# Patient Record
Sex: MALE | Race: WHITE | NOT HISPANIC OR LATINO | Employment: OTHER | URBAN - NONMETROPOLITAN AREA
[De-identification: names, ages, dates, MRNs, and addresses within clinical notes are randomized per-mention and may not be internally consistent; named-entity substitution may affect disease eponyms.]

---

## 2020-04-08 PROBLEM — I10 HTN (HYPERTENSION): Status: ACTIVE | Noted: 2020-04-08

## 2020-04-08 PROBLEM — K21.9 ACID REFLUX: Status: ACTIVE | Noted: 2020-04-08

## 2020-04-08 PROBLEM — E78.5 HYPERLIPIDEMIA: Status: ACTIVE | Noted: 2020-04-08

## 2020-04-08 RX ORDER — NITROGLYCERIN 0.4 MG/1
0.4 TABLET SUBLINGUAL
COMMUNITY
End: 2020-04-10 | Stop reason: SDUPTHER

## 2020-04-08 RX ORDER — METOPROLOL SUCCINATE 100 MG/1
100 TABLET, EXTENDED RELEASE ORAL DAILY
COMMUNITY
End: 2020-04-10 | Stop reason: SDUPTHER

## 2020-04-08 RX ORDER — UBIDECARENONE 100 MG
200 CAPSULE ORAL DAILY
COMMUNITY
End: 2020-04-10 | Stop reason: SDUPTHER

## 2020-04-08 RX ORDER — CILOSTAZOL 100 MG/1
100 TABLET ORAL 2 TIMES DAILY
COMMUNITY
End: 2020-04-10 | Stop reason: SDUPTHER

## 2020-04-08 RX ORDER — DIGOXIN 125 MCG
125 TABLET ORAL DAILY
COMMUNITY
End: 2020-04-10 | Stop reason: SDUPTHER

## 2020-04-08 RX ORDER — OMEPRAZOLE 20 MG/1
20 CAPSULE, DELAYED RELEASE ORAL DAILY
COMMUNITY
End: 2020-04-10 | Stop reason: SDUPTHER

## 2020-04-08 RX ORDER — ASPIRIN 81 MG/1
81 TABLET ORAL DAILY
COMMUNITY
End: 2020-04-10 | Stop reason: SDUPTHER

## 2020-04-08 RX ORDER — ROSUVASTATIN CALCIUM 20 MG/1
20 TABLET, COATED ORAL DAILY
COMMUNITY
End: 2020-04-10 | Stop reason: SDUPTHER

## 2020-04-08 RX ORDER — LISINOPRIL AND HYDROCHLOROTHIAZIDE 25; 20 MG/1; MG/1
1 TABLET ORAL DAILY
COMMUNITY
End: 2020-04-10 | Stop reason: SDUPTHER

## 2020-04-10 ENCOUNTER — OFFICE VISIT (OUTPATIENT)
Dept: CARDIOLOGY | Facility: CLINIC | Age: 70
End: 2020-04-10

## 2020-04-10 VITALS
DIASTOLIC BLOOD PRESSURE: 60 MMHG | SYSTOLIC BLOOD PRESSURE: 120 MMHG | WEIGHT: 163 LBS | BODY MASS INDEX: 24.14 KG/M2 | HEIGHT: 69 IN

## 2020-04-10 DIAGNOSIS — Z99.89 OSA ON CPAP: ICD-10-CM

## 2020-04-10 DIAGNOSIS — G47.33 OSA ON CPAP: ICD-10-CM

## 2020-04-10 DIAGNOSIS — I73.9 ASYMPTOMATIC PVD (PERIPHERAL VASCULAR DISEASE) (HCC): ICD-10-CM

## 2020-04-10 DIAGNOSIS — F17.211 CIGARETTE NICOTINE DEPENDENCE IN REMISSION: ICD-10-CM

## 2020-04-10 DIAGNOSIS — I25.10 ATHEROSCLEROSIS OF NATIVE CORONARY ARTERY OF NATIVE HEART WITHOUT ANGINA PECTORIS: ICD-10-CM

## 2020-04-10 DIAGNOSIS — I10 HYPERTENSION, UNSPECIFIED TYPE: Primary | ICD-10-CM

## 2020-04-10 DIAGNOSIS — E78.5 HYPERLIPIDEMIA, UNSPECIFIED HYPERLIPIDEMIA TYPE: ICD-10-CM

## 2020-04-10 DIAGNOSIS — Z95.1 S/P CABG (CORONARY ARTERY BYPASS GRAFT): ICD-10-CM

## 2020-04-10 PROCEDURE — 99441 PR PHYS/QHP TELEPHONE EVALUATION 5-10 MIN: CPT | Performed by: SPECIALIST

## 2020-04-10 RX ORDER — CILOSTAZOL 100 MG/1
100 TABLET ORAL 2 TIMES DAILY
Qty: 180 TABLET | Refills: 1 | Status: SHIPPED | OUTPATIENT
Start: 2020-04-10 | End: 2020-10-09 | Stop reason: SDUPTHER

## 2020-04-10 RX ORDER — OMEPRAZOLE 20 MG/1
20 CAPSULE, DELAYED RELEASE ORAL DAILY
Qty: 90 CAPSULE | Refills: 1 | Status: SHIPPED | OUTPATIENT
Start: 2020-04-10 | End: 2022-04-22 | Stop reason: SDUPTHER

## 2020-04-10 RX ORDER — ROSUVASTATIN CALCIUM 20 MG/1
20 TABLET, COATED ORAL DAILY
Qty: 90 TABLET | Refills: 1 | Status: SHIPPED | OUTPATIENT
Start: 2020-04-10 | End: 2020-10-14 | Stop reason: SDUPTHER

## 2020-04-10 RX ORDER — UBIDECARENONE 100 MG
200 CAPSULE ORAL DAILY
Qty: 90 CAPSULE | Refills: 1 | Status: SHIPPED | OUTPATIENT
Start: 2020-04-10

## 2020-04-10 RX ORDER — NITROGLYCERIN 0.4 MG/1
0.4 TABLET SUBLINGUAL
Qty: 90 TABLET | Refills: 1 | Status: SHIPPED | OUTPATIENT
Start: 2020-04-10 | End: 2020-10-15 | Stop reason: SDUPTHER

## 2020-04-10 RX ORDER — LISINOPRIL AND HYDROCHLOROTHIAZIDE 25; 20 MG/1; MG/1
1 TABLET ORAL DAILY
Qty: 90 TABLET | Refills: 1 | Status: SHIPPED | OUTPATIENT
Start: 2020-04-10 | End: 2020-10-14 | Stop reason: SDUPTHER

## 2020-04-10 RX ORDER — METOPROLOL SUCCINATE 100 MG/1
100 TABLET, EXTENDED RELEASE ORAL DAILY
Qty: 90 TABLET | Refills: 1 | Status: SHIPPED | OUTPATIENT
Start: 2020-04-10 | End: 2020-10-14 | Stop reason: SDUPTHER

## 2020-04-10 RX ORDER — ASPIRIN 81 MG/1
81 TABLET ORAL DAILY
Qty: 90 TABLET | Refills: 1 | Status: SHIPPED | OUTPATIENT
Start: 2020-04-10

## 2020-04-10 NOTE — PROGRESS NOTES
Subjective     Follow up, Hypertension  yRland Trujillo is a 69 y.o. male who presents to day for Establish Care (Telephone visit to establish care ).    CHIEF COMPLIANT  Chief Complaint   Patient presents with   • Establish Care     Telephone visit to establish care        Problem list:    1. Hypertension    2. Hyperlipidemia    3. Acid reflux     4. MUSTAPHA with cpap use      Active Problems:  Problem List Items Addressed This Visit        Cardiovascular and Mediastinum    Hyperlipidemia    Relevant Medications    rosuvastatin (CRESTOR) 20 MG tablet    HTN (hypertension) - Primary    Relevant Medications    metoprolol succinate XL (TOPROL-XL) 100 MG 24 hr tablet    lisinopril-hydrochlorothiazide (PRINZIDE,ZESTORETIC) 20-25 MG per tablet    Atherosclerosis of native coronary artery of native heart without angina pectoris    Asymptomatic PVD (peripheral vascular disease) (CMS/[facility])       Other    S/P CABG (coronary artery bypass graft)    Cigarette nicotine dependence in remission          HPI  You have chosen to receive care through a telephone visit today. Do you consent to use a telephone visit for your medical care today? Yes      Mr. Trujillo has been doing well he has been walking at least 1 to 2 miles a day he denied any chest pain no leg pain he also have some shortness of breath on extreme exertion but usually stable no palpitations no dizziness his blood pressure has been doing really well no new problems still having issues with the CPAP machine with difficulty because he is not used to be sleeping with the machine but he is trying seriously to get used to it      PRIOR MEDS  Current Outpatient Medications on File Prior to Visit   Medication Sig Dispense Refill   • aspirin 81 MG EC tablet Take 81 mg by mouth Daily.     • cilostazol (PLETAL) 100 MG tablet Take 100 mg by mouth 2 (Two) Times a Day.     • coenzyme Q10 100 MG capsule Take 200 mg by mouth Daily.     • digoxin (LANOXIN) 125 MCG tablet Take 125 mcg by mouth  Daily.     • lisinopril-hydrochlorothiazide (PRINZIDE,ZESTORETIC) 20-25 MG per tablet Take 1 tablet by mouth Daily.     • metoprolol succinate XL (TOPROL-XL) 100 MG 24 hr tablet Take 100 mg by mouth Daily.     • nitroglycerin (NITROSTAT) 0.4 MG SL tablet Place 0.4 mg under the tongue Every 5 (Five) Minutes As Needed. Take no more than 3 doses in 15 minutes.     • omeprazole (priLOSEC) 20 MG capsule Take 20 mg by mouth Daily.     • rosuvastatin (CRESTOR) 20 MG tablet Take 20 mg by mouth Daily.       No current facility-administered medications on file prior to visit.        ALLERGIES  Patient has no known allergies.    HISTORY  Past Medical History:   Diagnosis Date   • Acid reflux    • HTN (hypertension)    • Hyperlipidemia    • Sleep apnea        Social History     Socioeconomic History   • Marital status:      Spouse name: Not on file   • Number of children: Not on file   • Years of education: Not on file   • Highest education level: Not on file   Tobacco Use   • Smoking status: Former Smoker     Packs/day: 1.00     Years: 16.00     Pack years: 16.00     Types: Cigarettes     Last attempt to quit: 2005     Years since quitting: 15.2   • Smokeless tobacco: Never Used   Substance and Sexual Activity   • Alcohol use: Yes     Frequency: 2-4 times a month   • Drug use: Never   • Sexual activity: Defer       Family History   Problem Relation Age of Onset   • Heart disease Mother    • Hypertension Father    • Hyperlipidemia Father        Review of Systems   Constitutional: Negative.  Negative for chills, fatigue and fever.   HENT: Positive for rhinorrhea.    Eyes: Positive for visual disturbance (reading glasses ).   Respiratory: Positive for apnea (uses cpap nightly ). Negative for chest tightness and shortness of breath.    Cardiovascular: Negative.  Negative for chest pain, palpitations and leg swelling.   Gastrointestinal: Negative.  Negative for abdominal pain, blood in stool, nausea and vomiting.  "  Endocrine: Negative.  Negative for cold intolerance and heat intolerance.   Genitourinary: Negative.  Negative for dysuria, frequency, hematuria and urgency.   Musculoskeletal: Negative.  Negative for arthralgias and back pain.   Skin: Negative.  Negative for rash and wound.   Allergic/Immunologic: Positive for environmental allergies (seasonal ). Negative for food allergies.   Neurological: Negative.  Negative for dizziness, weakness and light-headedness.   Hematological: Bruises/bleeds easily.   Psychiatric/Behavioral: Negative.  Negative for sleep disturbance (denies waking with smothering ).       Objective     VITALS: /60 (BP Location: Left arm, Patient Position: Sitting) Comment: pt reported  Ht 175.3 cm (69\")   Wt 73.9 kg (163 lb) Comment: pt reported  BMI 24.07 kg/m²     LABS:   Lab Results (most recent)     None          IMAGING:   No Images in the past 120 days found..    EXAM:  Physical Exam  Examination is not possible as this is a telephone interview  Procedure   Procedures       Assessment/Plan    Diagnosis Plan   1. Hypertension, unspecified type     2. Hyperlipidemia, unspecified hyperlipidemia type     3. Atherosclerosis of native coronary artery of native heart without angina pectoris     4. S/P CABG (coronary artery bypass graft)     5. Asymptomatic PVD (peripheral vascular disease) (CMS/[facility])     6. Cigarette nicotine dependence in remission     1.  His blood pressure is well controlled we will continue current medications  2.  He had some labs a week ago I do not have the result I will try to get the results of these labs including lipid profile and I will repeat this prior to his next visit  3.  He has no chest pain he is active we will continue current medications he has been on digoxin for unclear reason we will discontinue digoxin  4.  His peripheral vascular disease stable continues to exercise advised to keep exercising we will continue current medications  5.  As above he still " using CPAP as I encouraged him to be persistent with it and he will and getting used to  6.  He is encouraged to keep part of this nicotine he still not smoking  I will see him back in 6 months      No follow-ups on file.    Ryland was seen today for establish care.    Diagnoses and all orders for this visit:    Hypertension, unspecified type    Hyperlipidemia, unspecified hyperlipidemia type    Atherosclerosis of native coronary artery of native heart without angina pectoris    S/P CABG (coronary artery bypass graft)    Asymptomatic PVD (peripheral vascular disease) (CMS/[facility])    Cigarette nicotine dependence in remission          This visit has been rescheduled as a phone visit to comply with patient safety concerns in accordance with CDC recommendations. Total time of discussion was 8 minutes.        MEDS ORDERED DURING VISIT:  No orders of the defined types were placed in this encounter.        This document has been electronically signed by Patricia Shaffer MD  April 10, 2020 09:08

## 2020-10-09 DIAGNOSIS — I73.9 ASYMPTOMATIC PVD (PERIPHERAL VASCULAR DISEASE) (HCC): ICD-10-CM

## 2020-10-09 RX ORDER — CILOSTAZOL 100 MG/1
100 TABLET ORAL 2 TIMES DAILY
Qty: 180 TABLET | Refills: 1 | Status: SHIPPED | OUTPATIENT
Start: 2020-10-09 | End: 2020-10-14 | Stop reason: SDUPTHER

## 2020-10-14 ENCOUNTER — OFFICE VISIT (OUTPATIENT)
Dept: CARDIOLOGY | Facility: CLINIC | Age: 70
End: 2020-10-14

## 2020-10-14 VITALS
WEIGHT: 179 LBS | DIASTOLIC BLOOD PRESSURE: 81 MMHG | BODY MASS INDEX: 26.51 KG/M2 | SYSTOLIC BLOOD PRESSURE: 134 MMHG | HEIGHT: 69 IN | HEART RATE: 85 BPM | OXYGEN SATURATION: 98 %

## 2020-10-14 DIAGNOSIS — I10 HYPERTENSION, UNSPECIFIED TYPE: ICD-10-CM

## 2020-10-14 DIAGNOSIS — I10 ESSENTIAL HYPERTENSION: ICD-10-CM

## 2020-10-14 DIAGNOSIS — G47.33 OSA ON CPAP: ICD-10-CM

## 2020-10-14 DIAGNOSIS — E78.2 MIXED HYPERLIPIDEMIA: ICD-10-CM

## 2020-10-14 DIAGNOSIS — E78.5 HYPERLIPIDEMIA, UNSPECIFIED HYPERLIPIDEMIA TYPE: ICD-10-CM

## 2020-10-14 DIAGNOSIS — I25.10 ATHEROSCLEROSIS OF NATIVE CORONARY ARTERY OF NATIVE HEART WITHOUT ANGINA PECTORIS: ICD-10-CM

## 2020-10-14 DIAGNOSIS — I73.9 ASYMPTOMATIC PVD (PERIPHERAL VASCULAR DISEASE) (HCC): Primary | ICD-10-CM

## 2020-10-14 DIAGNOSIS — Z95.1 S/P CABG (CORONARY ARTERY BYPASS GRAFT): ICD-10-CM

## 2020-10-14 DIAGNOSIS — Z99.89 OSA ON CPAP: ICD-10-CM

## 2020-10-14 PROCEDURE — 99213 OFFICE O/P EST LOW 20 MIN: CPT | Performed by: SPECIALIST

## 2020-10-14 RX ORDER — LISINOPRIL AND HYDROCHLOROTHIAZIDE 25; 20 MG/1; MG/1
1 TABLET ORAL DAILY
Qty: 90 TABLET | Refills: 1 | Status: SHIPPED | OUTPATIENT
Start: 2020-10-14 | End: 2021-04-14 | Stop reason: SDUPTHER

## 2020-10-14 RX ORDER — CILOSTAZOL 100 MG/1
100 TABLET ORAL 2 TIMES DAILY
Qty: 180 TABLET | Refills: 1 | Status: SHIPPED | OUTPATIENT
Start: 2020-10-14 | End: 2021-04-14 | Stop reason: SDUPTHER

## 2020-10-14 RX ORDER — ACETAMINOPHEN AND CODEINE PHOSPHATE 300; 30 MG/1; MG/1
TABLET ORAL
COMMUNITY
Start: 2020-08-31 | End: 2021-04-14

## 2020-10-14 RX ORDER — METOPROLOL SUCCINATE 100 MG/1
100 TABLET, EXTENDED RELEASE ORAL DAILY
Qty: 90 TABLET | Refills: 1 | Status: SHIPPED | OUTPATIENT
Start: 2020-10-14 | End: 2021-04-14 | Stop reason: SDUPTHER

## 2020-10-14 RX ORDER — ROSUVASTATIN CALCIUM 20 MG/1
20 TABLET, COATED ORAL DAILY
Qty: 90 TABLET | Refills: 1 | Status: SHIPPED | OUTPATIENT
Start: 2020-10-14 | End: 2021-04-14 | Stop reason: SDUPTHER

## 2020-10-14 NOTE — PROGRESS NOTES
Subjective   Follow up, hypertension  Ryland Trujillo is a 69 y.o. male who presents to day for Hypertension (Here for 6 mo. f/u), Hyperlipidemia, Coronary Artery Disease, and Sleep Apnea.    CHIEF COMPLIANT  Chief Complaint   Patient presents with   • Hypertension     Here for 6 mo. f/u   • Hyperlipidemia   • Coronary Artery Disease   • Sleep Apnea     Problem list:      1. Hypertension    2. Hyperlipidemia    3. Acid reflux     4. MUSTAPHA with cpap use      Problem List Items Addressed This Visit        Cardiovascular and Mediastinum    Hyperlipidemia    HTN (hypertension)    Atherosclerosis of native coronary artery of native heart without angina pectoris    Asymptomatic PVD (peripheral vascular disease) (CMS/HCC) - Primary       Respiratory    MUSTAPHA on CPAP       Other    S/P CABG (coronary artery bypass graft)          HPI  Is doing well he denies any chest pain or shortness of breath he started using CPAP denied any edema or palpitations any pain on the lower extremity with exertion no dizziness                PRIOR MEDS  Current Outpatient Medications on File Prior to Visit   Medication Sig Dispense Refill   • acetaminophen-codeine (TYLENOL #3) 300-30 MG per tablet TAKE 2 TABLETS PO QD     • aspirin 81 MG EC tablet Take 1 tablet by mouth Daily. 90 tablet 1   • cilostazol (PLETAL) 100 MG tablet Take 1 tablet by mouth 2 (Two) Times a Day. 180 tablet 1   • coenzyme Q10 100 MG capsule Take 2 capsules by mouth Daily. 90 capsule 1   • lisinopril-hydrochlorothiazide (PRINZIDE,ZESTORETIC) 20-25 MG per tablet Take 1 tablet by mouth Daily. 90 tablet 1   • metoprolol succinate XL (TOPROL-XL) 100 MG 24 hr tablet Take 1 tablet by mouth Daily. 90 tablet 1   • omeprazole (priLOSEC) 20 MG capsule Take 1 capsule by mouth Daily. 90 capsule 1   • rosuvastatin (CRESTOR) 20 MG tablet Take 1 tablet by mouth Daily. 90 tablet 1   • nitroglycerin (NITROSTAT) 0.4 MG SL tablet Place 1 tablet under the tongue Every 5 (Five) Minutes As Needed for  Chest Pain. Take no more than 3 doses in 15 minutes. 90 tablet 1     No current facility-administered medications on file prior to visit.        ALLERGIES  Patient has no known allergies.    HISTORY  Past Medical History:   Diagnosis Date   • Acid reflux    • HTN (hypertension)    • Hyperlipidemia    • Sleep apnea        Social History     Socioeconomic History   • Marital status:      Spouse name: Not on file   • Number of children: Not on file   • Years of education: Not on file   • Highest education level: Not on file   Tobacco Use   • Smoking status: Former Smoker     Packs/day: 1.00     Years: 16.00     Pack years: 16.00     Types: Cigarettes     Quit date: 2005     Years since quitting: 15.7   • Smokeless tobacco: Never Used   Substance and Sexual Activity   • Alcohol use: Yes     Frequency: 2-4 times a month   • Drug use: Never   • Sexual activity: Defer       Family History   Problem Relation Age of Onset   • Heart disease Mother    • Hypertension Father    • Hyperlipidemia Father        Review of Systems   Constitutional: Negative.  Negative for activity change and appetite change.   HENT: Negative.  Negative for congestion.    Eyes: Positive for visual disturbance (glasses prn). Negative for discharge and itching.   Respiratory: Negative.  Negative for apnea, cough, choking, chest tightness, shortness of breath, wheezing and stridor.    Cardiovascular: Negative.  Negative for chest pain and leg swelling.   Gastrointestinal: Negative.  Negative for abdominal distention and abdominal pain.   Endocrine: Negative.  Negative for cold intolerance and heat intolerance.   Genitourinary: Negative.  Negative for flank pain.   Musculoskeletal: Positive for arthralgias and myalgias.   Skin: Negative.  Negative for color change and pallor.   Allergic/Immunologic: Negative.  Negative for immunocompromised state.   Neurological: Negative.  Negative for dizziness and facial asymmetry.   Hematological: Negative.   "Does not bruise/bleed easily.   Psychiatric/Behavioral: Negative.  Negative for agitation and behavioral problems.       Objective     VITALS: /81 (BP Location: Left arm, Patient Position: Sitting)   Pulse 85   Ht 175.3 cm (69.02\")   Wt 81.2 kg (179 lb)   SpO2 98%   BMI 26.42 kg/m²     LABS:   Lab Results (most recent)     None          IMAGING:   No Images in the past 120 days found..    EXAM:  Physical Exam  Constitutional:       Appearance: He is well-developed.   HENT:      Head: Normocephalic and atraumatic.   Eyes:      Pupils: Pupils are equal, round, and reactive to light.   Neck:      Musculoskeletal: Neck supple. No edema.      Thyroid: No thyromegaly.      Vascular: No JVD.      Comments: Healed thyroidectomy scar  Cardiovascular:      Rate and Rhythm: Normal rate and regular rhythm.      Chest Wall: PMI is not displaced.      Pulses: Normal pulses.      Heart sounds: Normal heart sounds, S1 normal and S2 normal. No murmur. No friction rub. No gallop.       Comments: Well-healed thoracotomy scar  Pulmonary:      Effort: Pulmonary effort is normal. No respiratory distress.      Breath sounds: Normal breath sounds. No stridor. No wheezing or rales.   Chest:      Chest wall: No tenderness.   Abdominal:      General: Bowel sounds are normal. There is no distension.      Palpations: Abdomen is soft. There is no mass.      Tenderness: There is no abdominal tenderness. There is no guarding or rebound.   Skin:     General: Skin is warm and dry.      Coloration: Skin is not pale.      Findings: No erythema or rash.   Neurological:      Mental Status: He is alert and oriented to person, place, and time.      Cranial Nerves: No cranial nerve deficit.      Coordination: Coordination normal.   Psychiatric:         Behavior: Behavior normal.         Procedure   Procedures       Assessment/Plan    Diagnosis Plan   1. Asymptomatic PVD (peripheral vascular disease) (CMS/HCC)  cilostazol (PLETAL) 100 MG tablet   "    Comprehensive Metabolic Panel    Lipid Panel   2. Atherosclerosis of native coronary artery of native heart without angina pectoris  Comprehensive Metabolic Panel    Lipid Panel   3. Essential hypertension  Comprehensive Metabolic Panel   4. Mixed hyperlipidemia  Comprehensive Metabolic Panel   5. S/P CABG (coronary artery bypass graft)  Comprehensive Metabolic Panel    Lipid Panel   6. Hyperlipidemia, unspecified hyperlipidemia type  rosuvastatin (CRESTOR) 20 MG tablet    Comprehensive Metabolic Panel    Lipid Panel   7. Hypertension, unspecified type  metoprolol succinate XL (TOPROL-XL) 100 MG 24 hr tablet    lisinopril-hydrochlorothiazide (PRINZIDE,ZESTORETIC) 20-25 MG per tablet    Comprehensive Metabolic Panel   8. MUSTAPHA on CPAP     1.  He continues have no symptoms of claudications advised to continue exercise and decrease activity  2.  Blood pressures well controlled continue current management  3.  No chest pain continue current management  4.  He just started CPAP therapy PCP is following  5.  Unfortunately no labs available he said he had labs recently we will try to get the report    No follow-ups on file.    Diagnoses and all orders for this visit:    1. Asymptomatic PVD (peripheral vascular disease) (CMS/[facility]) (Primary)  -     cilostazol (PLETAL) 100 MG tablet; Take 1 tablet by mouth 2 (Two) Times a Day.  Dispense: 180 tablet; Refill: 1  -     Comprehensive Metabolic Panel; Future  -     Lipid Panel; Future    2. Atherosclerosis of native coronary artery of native heart without angina pectoris  -     Comprehensive Metabolic Panel; Future  -     Lipid Panel; Future    3. Essential hypertension  -     Comprehensive Metabolic Panel; Future    4. Mixed hyperlipidemia  -     Comprehensive Metabolic Panel; Future    5. S/P CABG (coronary artery bypass graft)  -     Comprehensive Metabolic Panel; Future  -     Lipid Panel; Future    6. Hyperlipidemia, unspecified hyperlipidemia type  -     rosuvastatin  (CRESTOR) 20 MG tablet; Take 1 tablet by mouth Daily.  Dispense: 90 tablet; Refill: 1  -     Comprehensive Metabolic Panel; Future  -     Lipid Panel; Future    7. Hypertension, unspecified type  -     metoprolol succinate XL (TOPROL-XL) 100 MG 24 hr tablet; Take 1 tablet by mouth Daily.  Dispense: 90 tablet; Refill: 1  -     lisinopril-hydrochlorothiazide (PRINZIDE,ZESTORETIC) 20-25 MG per tablet; Take 1 tablet by mouth Daily.  Dispense: 90 tablet; Refill: 1  -     Comprehensive Metabolic Panel; Future    8. MUSTAPHA on CPAP                 MEDS ORDERED DURING VISIT:  No orders of the defined types were placed in this encounter.        This document has been electronically signed by Patricia Shaffer MD  October 14, 2020 15:11 EDT

## 2020-10-14 NOTE — PATIENT INSTRUCTIONS
Obesity, Adult  Obesity is the condition of having too much total body fat. Being overweight or obese means that your weight is greater than what is considered healthy for your body size. Obesity is determined by a measurement called BMI. BMI is an estimate of body fat and is calculated from height and weight. For adults, a BMI of 30 or higher is considered obese.  Obesity can lead to other health concerns and major illnesses, including:  · Stroke.  · Coronary artery disease (CAD).  · Type 2 diabetes.  · Some types of cancer, including cancers of the colon, breast, uterus, and gallbladder.  · Osteoarthritis.  · High blood pressure (hypertension).  · High cholesterol.  · Sleep apnea.  · Gallbladder stones.  · Infertility problems.  What are the causes?  Common causes of this condition include:  · Eating daily meals that are high in calories, sugar, and fat.  · Being born with genes that may make you more likely to become obese.  · Having a medical condition that causes obesity, including:  ? Hypothyroidism.  ? Polycystic ovarian syndrome (PCOS).  ? Binge-eating disorder.  ? Cushing syndrome.  · Taking certain medicines, such as steroids, antidepressants, and seizure medicines.  · Not being physically active (sedentary lifestyle).  · Not getting enough sleep.  · Drinking high amounts of sugar-sweetened beverages, such as soft drinks.  What increases the risk?  The following factors may make you more likely to develop this condition:  · Having a family history of obesity.  · Being a woman of  descent.  · Being a man of  descent.  · Living in an area with limited access to:  ? [place], recreation centers, or sidewalks.  ? Healthy food choices, such as grocery stores and farmers' markets.  What are the signs or symptoms?  The main sign of this condition is having too much body fat.  How is this diagnosed?  This condition is diagnosed based on:  · Your BMI. If you are an adult with a BMI of 30 or  higher, you are considered obese.  · Your waist circumference. This measures the distance around your waistline.  · Your skinfold thickness. Your health care provider may gently pinch a fold of your skin and measure it.  You may have other tests to check for underlying conditions.  How is this treated?  Treatment for this condition often includes changing your lifestyle. Treatment may include some or all of the following:  · Dietary changes. This may include developing a healthy meal plan.  · Regular physical activity. This may include activity that causes your heart to beat faster (aerobic exercise) and strength training. Work with your health care provider to design an exercise program that works for you.  · Medicine to help you lose weight if you are unable to lose 1 pound a week after 6 weeks of healthy eating and more physical activity.  · Treating conditions that cause the obesity (underlying conditions).  · Surgery. Surgical options may include gastric banding and gastric bypass. Surgery may be done if:  ? Other treatments have not helped to improve your condition.  ? You have a BMI of 40 or higher.  ? You have life-threatening health problems related to obesity.  Follow these instructions at home:  Eating and drinking    · Follow recommendations from your health care provider about what you eat and drink. Your health care provider may advise you to:  ? Limit fast food, sweets, and processed snack foods.  ? Choose low-fat options, such as low-fat milk instead of whole milk.  ? Eat 5 or more servings of fruits or vegetables every day.  ? Eat at home more often. This gives you more control over what you eat.  ? Choose healthy foods when you eat out.  ? Learn to read food labels. This will help you understand how much food is considered 1 serving.  ? Learn what a healthy serving size is.  ? Keep low-fat snacks available.  ? Limit sugary drinks, such as soda, fruit juice, sweetened iced tea, and flavored  milk.  · Drink enough water to keep your urine pale yellow.  · Do not follow a fad diet. Fad diets can be unhealthy and even dangerous.  Physical activity  · Exercise regularly, as told by your health care provider.  ? Most adults should get up to 150 minutes of moderate-intensity exercise every week.  ? Ask your health care provider what types of exercise are safe for you and how often you should exercise.  · Warm up and stretch before being active.  · Cool down and stretch after being active.  · Rest between periods of activity.  Lifestyle  · Work with your health care provider and a dietitian to set a weight-loss goal that is healthy and reasonable for you.  · Limit your screen time.  · Find ways to reward yourself that do not involve food.  · Do not drink alcohol if:  ? Your health care provider tells you not to drink.  ? You are pregnant, may be pregnant, or are planning to become pregnant.  · If you drink alcohol:  ? Limit how much you use to:  § 0-1 drink a day for women.  § 0-2 drinks a day for men.  ? Be aware of how much alcohol is in your drink. In the [place] one drink equals one 12 oz bottle of beer (355 mL), one 5 oz glass of wine (148 mL), or one 1½ oz glass of hard liquor (44 mL).  General instructions  · Keep a weight-loss journal to keep track of the food you eat and how much exercise you get.  · Take over-the-counter and prescription medicines only as told by your health care provider.  · Take vitamins and supplements only as told by your health care provider.  · Consider joining a support group. Your health care provider may be able to recommend a support group.  · Keep all follow-up visits as told by your health care provider. This is important.  Contact a health care provider if:  · You are unable to meet your weight loss goal after 6 weeks of dietary and lifestyle changes.  Get help right away if you are having:  · Trouble breathing.  · Suicidal thoughts or behaviors.  Summary  · Obesity is the  condition of having too much total body fat.  · Being overweight or obese means that your weight is greater than what is considered healthy for your body size.  · Work with your health care provider and a dietitian to set a weight-loss goal that is healthy and reasonable for you.  · Exercise regularly, as told by your health care provider. Ask your health care provider what types of exercise are safe for you and how often you should exercise.  This information is not intended to replace advice given to you by your health care provider. Make sure you discuss any questions you have with your health care provider.  Document Released: 01/25/2006 Document Revised: 08/22/2019 Document Reviewed: 08/22/2019  [employer] Patient Education © 2020 [employer]  MyPlate from [employer]    MyPlate is an outline of a general healthy diet based on the 2010 Dietary Guidelines for Americans, from the [place] Department of Agriculture (USDA). It sets guidelines for how much food you should eat from each food group based on your age, sex, and level of physical activity.  What are tips for following MyPlate?  To follow MyPlate recommendations:  · Eat a wide variety of fruits and vegetables, grains, and protein foods.  · Serve smaller portions and eat less food throughout the day.  · Limit portion sizes to avoid overeating.  · Enjoy your food.  · Get at least 150 minutes of exercise every week. This is about 30 minutes each day, 5 or more days per week.  It can be difficult to have every meal look like MyPlate. Think about MyPlate as eating guidelines for an entire day, rather than each individual meal.  Fruits and vegetables  · Make half of your plate fruits and vegetables.  · Eat many different colors of fruits and vegetables each day.  · For a 2,000 calorie daily food plan, eat:  ? 2½ cups of vegetables every day.  ? 2 cups of fruit every day.  · 1 cup is equal to:  ? 1 cup raw or cooked vegetables.  ? 1 cup raw fruit.  ? 1 medium-sized orange,  apple, or banana.  ? 1 cup 100% fruit or vegetable juice.  ? 2 cups raw leafy greens, such as lettuce, spinach, or kale.  ? ½ cup dried fruit.  Grains  · One fourth of your plate should be grains.  · Make at least half of the grains you eat each day whole grains.  · For a 2,000 calorie daily food plan, eat 6 oz of grains every day.  · 1 oz is equal to:  ? 1 slice bread.  ? 1 cup cereal.  ? ½ cup cooked rice, cereal, or pasta.  Protein  · One fourth of your plate should be protein.  · Eat a wide variety of protein foods, including meat, poultry, fish, eggs, beans, nuts, and tofu.  · For a 2,000 calorie daily food plan, eat 5½ oz of protein every day.  · 1 oz is equal to:  ? 1 oz meat, poultry, or fish.  ? ¼ cup cooked beans.  ? 1 egg.  ? ½ oz nuts or seeds.  ? 1 Tbsp peanut butter.  Dairy  · Drink fat-free or low-fat (1%) milk.  · Eat or drink dairy as a side to meals.  · For a 2,000 calorie daily food plan, eat or drink 3 cups of dairy every day.  · 1 cup is equal to:  ? 1 cup milk, yogurt, cottage cheese, or soy milk (soy beverage).  ? 2 oz processed cheese.  ? 1½ oz natural cheese.  Fats, oils, salt, and sugars  · Only small amounts of oils are recommended.  · Avoid foods that are high in calories and low in nutritional value (empty calories), like foods high in fat or added sugars.  · Choose foods that are low in salt (sodium). Choose foods that have less than 140 milligrams (mg) of sodium per serving.  · Drink water instead of sugary drinks. Drink enough water each day to keep your urine pale yellow.  Where to find support  · Work with your health care provider or a nutrition specialist (dietitian) to develop a customized eating plan that is right for you.  · Download an [employer]ma (mobile application) to help you track your daily food intake.  Where to find more information  · Go to ChooseMyPlate.gov for more information.  Summary  · MyPlate is a general guideline for healthy eating from the USDA. It is based on the  2010 Dietary Guidelines for Americans.  · In general, fruits and vegetables should take up ½ of your plate, grains should take up ¼ of your plate, and protein should take up ¼ of your plate.  This information is not intended to replace advice given to you by your health care provider. Make sure you discuss any questions you have with your health care provider.  Document Released: 01/06/2009 Document Revised: 05/21/2020 Document Reviewed: 03/19/2018  [employer] Patient Education © 2020 [employer]

## 2020-10-15 DIAGNOSIS — I25.10 ATHEROSCLEROSIS OF NATIVE CORONARY ARTERY OF NATIVE HEART WITHOUT ANGINA PECTORIS: ICD-10-CM

## 2020-10-15 RX ORDER — NITROGLYCERIN 0.4 MG/1
0.4 TABLET SUBLINGUAL
Qty: 25 TABLET | Refills: 3 | Status: SHIPPED | OUTPATIENT
Start: 2020-10-15 | End: 2021-02-17 | Stop reason: SDUPTHER

## 2021-02-17 DIAGNOSIS — I25.10 ATHEROSCLEROSIS OF NATIVE CORONARY ARTERY OF NATIVE HEART WITHOUT ANGINA PECTORIS: ICD-10-CM

## 2021-02-17 DIAGNOSIS — R07.2 PRECORDIAL PAIN: Primary | ICD-10-CM

## 2021-02-17 RX ORDER — NITROGLYCERIN 0.4 MG/1
0.4 TABLET SUBLINGUAL
Qty: 25 TABLET | Refills: 3 | Status: SHIPPED | OUTPATIENT
Start: 2021-02-17 | End: 2021-04-14 | Stop reason: SDUPTHER

## 2021-04-14 ENCOUNTER — OFFICE VISIT (OUTPATIENT)
Dept: CARDIOLOGY | Facility: CLINIC | Age: 71
End: 2021-04-14

## 2021-04-14 VITALS
HEIGHT: 69 IN | OXYGEN SATURATION: 96 % | WEIGHT: 175.6 LBS | BODY MASS INDEX: 26.01 KG/M2 | DIASTOLIC BLOOD PRESSURE: 66 MMHG | HEART RATE: 89 BPM | SYSTOLIC BLOOD PRESSURE: 108 MMHG

## 2021-04-14 DIAGNOSIS — R07.2 PRECORDIAL PAIN: ICD-10-CM

## 2021-04-14 DIAGNOSIS — I73.9 ASYMPTOMATIC PVD (PERIPHERAL VASCULAR DISEASE) (HCC): ICD-10-CM

## 2021-04-14 DIAGNOSIS — E78.2 MIXED HYPERLIPIDEMIA: ICD-10-CM

## 2021-04-14 DIAGNOSIS — Z99.89 OSA ON CPAP: ICD-10-CM

## 2021-04-14 DIAGNOSIS — E78.5 HYPERLIPIDEMIA, UNSPECIFIED HYPERLIPIDEMIA TYPE: ICD-10-CM

## 2021-04-14 DIAGNOSIS — Z95.1 S/P CABG (CORONARY ARTERY BYPASS GRAFT): ICD-10-CM

## 2021-04-14 DIAGNOSIS — G47.33 OSA ON CPAP: ICD-10-CM

## 2021-04-14 DIAGNOSIS — I10 ESSENTIAL HYPERTENSION: Primary | ICD-10-CM

## 2021-04-14 DIAGNOSIS — I25.10 ATHEROSCLEROSIS OF NATIVE CORONARY ARTERY OF NATIVE HEART WITHOUT ANGINA PECTORIS: ICD-10-CM

## 2021-04-14 DIAGNOSIS — I10 HYPERTENSION, UNSPECIFIED TYPE: ICD-10-CM

## 2021-04-14 PROCEDURE — 99214 OFFICE O/P EST MOD 30 MIN: CPT | Performed by: SPECIALIST

## 2021-04-14 RX ORDER — LISINOPRIL AND HYDROCHLOROTHIAZIDE 25; 20 MG/1; MG/1
1 TABLET ORAL DAILY
Qty: 90 TABLET | Refills: 1 | Status: SHIPPED | OUTPATIENT
Start: 2021-04-14 | End: 2021-10-08

## 2021-04-14 RX ORDER — NITROGLYCERIN 0.4 MG/1
0.4 TABLET SUBLINGUAL
Qty: 25 TABLET | Refills: 3 | Status: SHIPPED | OUTPATIENT
Start: 2021-04-14 | End: 2021-10-22 | Stop reason: SDUPTHER

## 2021-04-14 RX ORDER — ROSUVASTATIN CALCIUM 20 MG/1
20 TABLET, COATED ORAL DAILY
Qty: 90 TABLET | Refills: 1 | Status: SHIPPED | OUTPATIENT
Start: 2021-04-14 | End: 2021-10-08

## 2021-04-14 RX ORDER — METOPROLOL SUCCINATE 100 MG/1
100 TABLET, EXTENDED RELEASE ORAL DAILY
Qty: 90 TABLET | Refills: 1 | Status: SHIPPED | OUTPATIENT
Start: 2021-04-14 | End: 2021-10-08

## 2021-04-14 RX ORDER — CILOSTAZOL 100 MG/1
100 TABLET ORAL 2 TIMES DAILY
Qty: 180 TABLET | Refills: 1 | Status: SHIPPED | OUTPATIENT
Start: 2021-04-14 | End: 2021-10-22 | Stop reason: SDUPTHER

## 2021-04-14 NOTE — PROGRESS NOTES
Subjective   Follow up, hypertension  Ryland Trujillo is a 70 y.o. male who presents to day for Follow-up (Here for 6 mo. f/u), Coronary Artery Disease, Hypertension, Hyperlipidemia, and Sleep Apnea.    CHIEF COMPLIANT  Chief Complaint   Patient presents with   • Follow-up     Here for 6 mo. f/u   • Coronary Artery Disease   • Hypertension   • Hyperlipidemia   • Sleep Apnea     Problem list:       1. Hypertension    2. Hyperlipidemia    3. Acid reflux     4. MUSTAPHA with cpap use      Problem List Items Addressed This Visit        Cardiac and Vasculature    Hyperlipidemia    Relevant Medications    rosuvastatin (CRESTOR) 20 MG tablet    HTN (hypertension) - Primary    Relevant Medications    metoprolol succinate XL (TOPROL-XL) 100 MG 24 hr tablet    lisinopril-hydrochlorothiazide (PRINZIDE,ZESTORETIC) 20-25 MG per tablet    Atherosclerosis of native coronary artery of native heart without angina pectoris    Relevant Medications    metoprolol succinate XL (TOPROL-XL) 100 MG 24 hr tablet    cilostazol (PLETAL) 100 MG tablet    nitroglycerin (NITROSTAT) 0.4 MG SL tablet    S/P CABG (coronary artery bypass graft)    Asymptomatic PVD (peripheral vascular disease) (CMS/[facility])    Relevant Medications    cilostazol (PLETAL) 100 MG tablet       Sleep    MUSTAPHA on CPAP      Other Visit Diagnoses     Precordial pain        Relevant Medications    nitroglycerin (NITROSTAT) 0.4 MG SL tablet          HPI  He is doing well he is recovering from COVID-19 infection in December but he is not having any chest pain no shortness of breath no edema no palpitations no dizziness                      PRIOR MEDS  Current Outpatient Medications on File Prior to Visit   Medication Sig Dispense Refill   • aspirin 81 MG EC tablet Take 1 tablet by mouth Daily. 90 tablet 1   • coenzyme Q10 100 MG capsule Take 2 capsules by mouth Daily. 90 capsule 1   • omeprazole (priLOSEC) 20 MG capsule Take 1 capsule by mouth Daily. 90 capsule 1   • [DISCONTINUED] cilostazol  (PLETAL) 100 MG tablet Take 1 tablet by mouth 2 (Two) Times a Day. 180 tablet 1   • [DISCONTINUED] lisinopril-hydrochlorothiazide (PRINZIDE,ZESTORETIC) 20-25 MG per tablet Take 1 tablet by mouth Daily. 90 tablet 1   • [DISCONTINUED] metoprolol succinate XL (TOPROL-XL) 100 MG 24 hr tablet Take 1 tablet by mouth Daily. 90 tablet 1   • [DISCONTINUED] rosuvastatin (CRESTOR) 20 MG tablet Take 1 tablet by mouth Daily. 90 tablet 1   • [DISCONTINUED] acetaminophen-codeine (TYLENOL #3) 300-30 MG per tablet TAKE 2 TABLETS PO QD     • [DISCONTINUED] nitroglycerin (NITROSTAT) 0.4 MG SL tablet Place 1 tablet under the tongue Every 5 (Five) Minutes As Needed for Chest Pain. Take no more than 3 doses in 15 minutes. 25 tablet 3     No current facility-administered medications on file prior to visit.       ALLERGIES  Patient has no known allergies.    HISTORY  Past Medical History:   Diagnosis Date   • Acid reflux    • HTN (hypertension)    • Hyperlipidemia    • Sleep apnea        Social History     Socioeconomic History   • Marital status:      Spouse name: Not on file   • Number of children: Not on file   • Years of education: Not on file   • Highest education level: Not on file   Tobacco Use   • Smoking status: Former Smoker     Packs/day: 1.00     Years: 16.00     Pack years: 16.00     Types: Cigarettes     Quit date:      Years since quittin.2   • Smokeless tobacco: Never Used   Substance and Sexual Activity   • Alcohol use: Yes   • Drug use: Never   • Sexual activity: Defer       Family History   Problem Relation Age of Onset   • Heart disease Mother    • Hypertension Father    • Hyperlipidemia Father        Review of Systems   Constitutional: Negative.  Negative for activity change and appetite change.   HENT: Positive for hearing loss.    Eyes: Positive for visual disturbance (wears glasses).   Respiratory: Negative.  Negative for apnea, cough, choking, chest tightness, shortness of breath, wheezing and  "stridor.    Cardiovascular: Negative.  Negative for chest pain, palpitations and leg swelling.   Gastrointestinal: Negative.  Negative for abdominal distention and abdominal pain.   Endocrine: Negative.    Genitourinary: Negative.    Musculoskeletal: Positive for arthralgias and myalgias.   Skin: Negative.    Allergic/Immunologic: Negative.    Neurological: Negative.    Hematological: Negative.    Psychiatric/Behavioral: Negative.        Objective     VITALS: /66 (BP Location: Left arm, Patient Position: Sitting)   Pulse 89   Ht 175.3 cm (69.02\")   Wt 79.7 kg (175 lb 9.6 oz)   SpO2 96%   BMI 25.92 kg/m²     LABS:   Lab Results (most recent)     None          IMAGING:   No Images in the past 120 days found..    EXAM:  Physical Exam  Constitutional:       Appearance: He is well-developed.   HENT:      Head: Normocephalic and atraumatic.   Eyes:      Pupils: Pupils are equal, round, and reactive to light.   Neck:      Thyroid: No thyromegaly.      Vascular: No JVD.   Cardiovascular:      Rate and Rhythm: Normal rate and regular rhythm.      Chest Wall: PMI is not displaced.      Pulses: Normal pulses.      Heart sounds: Normal heart sounds, S1 normal and S2 normal. No murmur heard.   No friction rub. No gallop.       Comments: Healed thoracotomy scar  Pulmonary:      Effort: Pulmonary effort is normal. No respiratory distress.      Breath sounds: Normal breath sounds. No stridor. No wheezing or rales.   Chest:      Chest wall: No tenderness.   Abdominal:      General: Bowel sounds are normal. There is no distension.      Palpations: Abdomen is soft. There is no mass.      Tenderness: There is no abdominal tenderness. There is no guarding or rebound.   Musculoskeletal:      Cervical back: Neck supple. No edema.   Skin:     General: Skin is warm and dry.      Coloration: Skin is not pale.      Findings: No erythema or rash.   Neurological:      Mental Status: He is alert and oriented to person, place, and " time.      Cranial Nerves: No cranial nerve deficit.      Coordination: Coordination normal.   Psychiatric:         Behavior: Behavior normal.         Procedure   Procedures       Assessment/Plan     Diagnoses and all orders for this visit:    1. Essential hypertension (Primary)    2. Asymptomatic PVD (peripheral vascular disease) (CMS/[facility])  -     cilostazol (PLETAL) 100 MG tablet; Take 1 tablet by mouth 2 (Two) Times a Day.  Dispense: 180 tablet; Refill: 1    3. Atherosclerosis of native coronary artery of native heart without angina pectoris  -     nitroglycerin (NITROSTAT) 0.4 MG SL tablet; Place 1 tablet under the tongue Every 5 (Five) Minutes As Needed for Chest Pain. Take no more than 3 doses in 15 minutes.  Dispense: 25 tablet; Refill: 3    4. Mixed hyperlipidemia    5. S/P CABG (coronary artery bypass graft)    6. MUSTAPHA on CPAP    7. Hyperlipidemia, unspecified hyperlipidemia type  -     rosuvastatin (CRESTOR) 20 MG tablet; Take 1 tablet by mouth Daily.  Dispense: 90 tablet; Refill: 1    8. Hypertension, unspecified type  -     metoprolol succinate XL (TOPROL-XL) 100 MG 24 hr tablet; Take 1 tablet by mouth Daily.  Dispense: 90 tablet; Refill: 1  -     lisinopril-hydrochlorothiazide (PRINZIDE,ZESTORETIC) 20-25 MG per tablet; Take 1 tablet by mouth Daily.  Dispense: 90 tablet; Refill: 1    9. Precordial pain  -     nitroglycerin (NITROSTAT) 0.4 MG SL tablet; Place 1 tablet under the tongue Every 5 (Five) Minutes As Needed for Chest Pain. Take no more than 3 doses in 15 minutes.  Dispense: 25 tablet; Refill: 3    1.  His blood pressure is well controlled continue current management including with metoprolol  2.   he has no chest pain he is active we will continue current management  3.  Unfortunately I do not have his labs he had his labs done recently at his primary care physician we will try to get report  4.  He is using CPAP most of the nights  5.  No peripheral vascular disease complete symptoms include no  claudications    Return in about 6 months (around 10/14/2021).    Ryland Trujillo  reports that he quit smoking about 16 years ago. His smoking use included cigarettes. He has a 16.00 pack-year smoking history. He has never used smokeless tobacco.. I have educated him on the risk of diseases from using tobacco products such as cancer, COPD and heart disease.                Advance Care Planning   ACP discussion was held with the patient during this visit. Patient does not have an advance directive, declines further assistance.     Patient's Body mass index is 25.92 kg/m². BMI is within normal parameters. No follow-up required..           MEDS ORDERED DURING VISIT:  New Medications Ordered This Visit   Medications   • rosuvastatin (CRESTOR) 20 MG tablet     Sig: Take 1 tablet by mouth Daily.     Dispense:  90 tablet     Refill:  1   • metoprolol succinate XL (TOPROL-XL) 100 MG 24 hr tablet     Sig: Take 1 tablet by mouth Daily.     Dispense:  90 tablet     Refill:  1   • lisinopril-hydrochlorothiazide (PRINZIDE,ZESTORETIC) 20-25 MG per tablet     Sig: Take 1 tablet by mouth Daily.     Dispense:  90 tablet     Refill:  1   • cilostazol (PLETAL) 100 MG tablet     Sig: Take 1 tablet by mouth 2 (Two) Times a Day.     Dispense:  180 tablet     Refill:  1   • nitroglycerin (NITROSTAT) 0.4 MG SL tablet     Sig: Place 1 tablet under the tongue Every 5 (Five) Minutes As Needed for Chest Pain. Take no more than 3 doses in 15 minutes.     Dispense:  25 tablet     Refill:  3       As always, Lowell Tam APRN  I appreciate very much the opportunity to participate in the cardiovascular care of your patients. Please do not hesitate to call me with any questions with regards to Ryland Trujillo evaluation and management.         This document has been electronically signed by Patricia Shaffer MD  April 14, 2021 15:30 EDT

## 2021-10-08 DIAGNOSIS — E78.5 HYPERLIPIDEMIA, UNSPECIFIED HYPERLIPIDEMIA TYPE: ICD-10-CM

## 2021-10-08 DIAGNOSIS — I10 HYPERTENSION, UNSPECIFIED TYPE: ICD-10-CM

## 2021-10-08 RX ORDER — METOPROLOL SUCCINATE 100 MG/1
100 TABLET, EXTENDED RELEASE ORAL DAILY
Qty: 90 TABLET | Refills: 1 | Status: SHIPPED | OUTPATIENT
Start: 2021-10-08 | End: 2021-10-22 | Stop reason: SDUPTHER

## 2021-10-08 RX ORDER — ROSUVASTATIN CALCIUM 20 MG/1
20 TABLET, COATED ORAL DAILY
Qty: 90 TABLET | Refills: 1 | Status: SHIPPED | OUTPATIENT
Start: 2021-10-08 | End: 2021-10-22

## 2021-10-08 RX ORDER — LISINOPRIL AND HYDROCHLOROTHIAZIDE 25; 20 MG/1; MG/1
1 TABLET ORAL DAILY
Qty: 90 TABLET | Refills: 1 | Status: SHIPPED | OUTPATIENT
Start: 2021-10-08 | End: 2021-10-22 | Stop reason: SDUPTHER

## 2021-10-22 ENCOUNTER — OFFICE VISIT (OUTPATIENT)
Dept: CARDIOLOGY | Facility: CLINIC | Age: 71
End: 2021-10-22

## 2021-10-22 VITALS
WEIGHT: 177 LBS | HEIGHT: 69 IN | BODY MASS INDEX: 26.22 KG/M2 | HEART RATE: 77 BPM | SYSTOLIC BLOOD PRESSURE: 138 MMHG | OXYGEN SATURATION: 98 % | DIASTOLIC BLOOD PRESSURE: 83 MMHG

## 2021-10-22 DIAGNOSIS — I25.10 ATHEROSCLEROSIS OF NATIVE CORONARY ARTERY OF NATIVE HEART WITHOUT ANGINA PECTORIS: ICD-10-CM

## 2021-10-22 DIAGNOSIS — Z99.89 OSA ON CPAP: ICD-10-CM

## 2021-10-22 DIAGNOSIS — E78.5 HYPERLIPIDEMIA, UNSPECIFIED HYPERLIPIDEMIA TYPE: ICD-10-CM

## 2021-10-22 DIAGNOSIS — I10 HYPERTENSION, UNSPECIFIED TYPE: ICD-10-CM

## 2021-10-22 DIAGNOSIS — I73.9 ASYMPTOMATIC PVD (PERIPHERAL VASCULAR DISEASE) (HCC): ICD-10-CM

## 2021-10-22 DIAGNOSIS — I10 PRIMARY HYPERTENSION: Primary | ICD-10-CM

## 2021-10-22 DIAGNOSIS — G47.33 OSA ON CPAP: ICD-10-CM

## 2021-10-22 DIAGNOSIS — Z95.1 S/P CABG (CORONARY ARTERY BYPASS GRAFT): ICD-10-CM

## 2021-10-22 DIAGNOSIS — R07.2 PRECORDIAL PAIN: ICD-10-CM

## 2021-10-22 DIAGNOSIS — E78.2 MIXED HYPERLIPIDEMIA: ICD-10-CM

## 2021-10-22 PROCEDURE — 99214 OFFICE O/P EST MOD 30 MIN: CPT | Performed by: SPECIALIST

## 2021-10-22 RX ORDER — METOPROLOL SUCCINATE 100 MG/1
100 TABLET, EXTENDED RELEASE ORAL DAILY
Qty: 90 TABLET | Refills: 1 | Status: SHIPPED | OUTPATIENT
Start: 2021-10-22 | End: 2022-04-22 | Stop reason: SDUPTHER

## 2021-10-22 RX ORDER — NITROGLYCERIN 0.4 MG/1
0.4 TABLET SUBLINGUAL
Qty: 25 TABLET | Refills: 3 | Status: SHIPPED | OUTPATIENT
Start: 2021-10-22 | End: 2022-04-22 | Stop reason: SDUPTHER

## 2021-10-22 RX ORDER — ROSUVASTATIN CALCIUM 40 MG/1
40 TABLET, COATED ORAL DAILY
Qty: 90 TABLET | Refills: 1 | Status: SHIPPED | OUTPATIENT
Start: 2021-10-22 | End: 2021-10-22 | Stop reason: SDUPTHER

## 2021-10-22 RX ORDER — LISINOPRIL AND HYDROCHLOROTHIAZIDE 25; 20 MG/1; MG/1
1 TABLET ORAL DAILY
Qty: 90 TABLET | Refills: 1 | Status: SHIPPED | OUTPATIENT
Start: 2021-10-22 | End: 2022-04-22 | Stop reason: SDUPTHER

## 2021-10-22 RX ORDER — ROSUVASTATIN CALCIUM 40 MG/1
40 TABLET, COATED ORAL DAILY
Qty: 90 TABLET | Refills: 1 | Status: SHIPPED | OUTPATIENT
Start: 2021-10-22 | End: 2022-04-22 | Stop reason: SDUPTHER

## 2021-10-22 RX ORDER — CILOSTAZOL 100 MG/1
100 TABLET ORAL 2 TIMES DAILY
Qty: 180 TABLET | Refills: 1 | Status: SHIPPED | OUTPATIENT
Start: 2021-10-22 | End: 2022-04-22 | Stop reason: SDUPTHER

## 2021-10-22 NOTE — PROGRESS NOTES
Subjective   Follow up, hypertension  Ryland Trujillo is a 70 y.o. male who presents to day for Follow-up (Here for 6 mo. f/u), Coronary Artery Disease, Hyperlipidemia, Hypertension, and Sleep Apnea.    CHIEF COMPLIANT  Chief Complaint   Patient presents with   • Follow-up     Here for 6 mo. f/u   • Coronary Artery Disease   • Hyperlipidemia   • Hypertension   • Sleep Apnea     Problem list:       1. Hypertension    2. Hyperlipidemia    3. Acid reflux     4. MUSTAPHA with cpap use    Problem List Items Addressed This Visit        Cardiac and Vasculature    Hyperlipidemia    Relevant Medications    rosuvastatin (CRESTOR) 40 MG tablet    Other Relevant Orders    Lipid Panel    Comprehensive Metabolic Panel    HTN (hypertension) - Primary    Relevant Medications    lisinopril-hydrochlorothiazide (PRINZIDE,ZESTORETIC) 20-25 MG per tablet    metoprolol succinate XL (TOPROL-XL) 100 MG 24 hr tablet    Atherosclerosis of native coronary artery of native heart without angina pectoris    Relevant Medications    metoprolol succinate XL (TOPROL-XL) 100 MG 24 hr tablet    cilostazol (PLETAL) 100 MG tablet    nitroglycerin (NITROSTAT) 0.4 MG SL tablet    S/P CABG (coronary artery bypass graft)    Asymptomatic PVD (peripheral vascular disease) (HCC)    Relevant Medications    cilostazol (PLETAL) 100 MG tablet       Sleep    MUSTAPHA on CPAP      Other Visit Diagnoses     Precordial pain        Relevant Medications    nitroglycerin (NITROSTAT) 0.4 MG SL tablet          HPI  Has been active he has been walking every day he denies any chest pain he has some shortness of breath with exertion class II since he had the COVID-19 infection is improving but not completely back to normal is no edema no palpitations no dizziness he is using the CPAP every night                    PRIOR MEDS  Current Outpatient Medications on File Prior to Visit   Medication Sig Dispense Refill   • aspirin 81 MG EC tablet Take 1 tablet by mouth Daily. 90 tablet 1   •  coenzyme Q10 100 MG capsule Take 2 capsules by mouth Daily. (Patient taking differently: Take 100 mg by mouth Daily.) 90 capsule 1   • omeprazole (priLOSEC) 20 MG capsule Take 1 capsule by mouth Daily. 90 capsule 1   • [DISCONTINUED] cilostazol (PLETAL) 100 MG tablet Take 1 tablet by mouth 2 (Two) Times a Day. 180 tablet 1   • [DISCONTINUED] lisinopril-hydrochlorothiazide (PRINZIDE,ZESTORETIC) 20-25 MG per tablet TAKE 1 TABLET BY MOUTH DAILY 90 tablet 1   • [DISCONTINUED] metoprolol succinate XL (TOPROL-XL) 100 MG 24 hr tablet TAKE 1 TABLET BY MOUTH DAILY 90 tablet 1   • [DISCONTINUED] rosuvastatin (CRESTOR) 20 MG tablet TAKE 1 TABLET BY MOUTH DAILY 90 tablet 1   • [DISCONTINUED] nitroglycerin (NITROSTAT) 0.4 MG SL tablet Place 1 tablet under the tongue Every 5 (Five) Minutes As Needed for Chest Pain. Take no more than 3 doses in 15 minutes. 25 tablet 3     No current facility-administered medications on file prior to visit.       ALLERGIES  Patient has no known allergies.    HISTORY  Past Medical History:   Diagnosis Date   • Acid reflux    • HTN (hypertension)    • Hyperlipidemia    • Sleep apnea        Social History     Socioeconomic History   • Marital status:    Tobacco Use   • Smoking status: Former Smoker     Packs/day: 1.00     Years: 16.00     Pack years: 16.00     Types: Cigarettes     Quit date:      Years since quittin.8   • Smokeless tobacco: Never Used   Substance and Sexual Activity   • Alcohol use: Yes   • Drug use: Never   • Sexual activity: Defer       Family History   Problem Relation Age of Onset   • Heart disease Mother    • Hypertension Father    • Hyperlipidemia Father        Review of Systems   Constitutional: Positive for fatigue (off and on).   HENT: Negative.    Eyes: Positive for visual disturbance (glasses prn).   Respiratory: Positive for shortness of breath (since Hx of covid).    Cardiovascular: Negative.    Gastrointestinal: Negative.    Endocrine: Negative.   "  Genitourinary: Negative.    Musculoskeletal: Positive for arthralgias and myalgias.   Skin: Negative.    Allergic/Immunologic: Negative.    Neurological: Negative.    Hematological: Bruises/bleeds easily.   Psychiatric/Behavioral: Negative.        Objective     VITALS: /83 (BP Location: Right leg, Patient Position: Sitting)   Pulse 77   Ht 175.3 cm (69.02\")   Wt 80.3 kg (177 lb)   SpO2 98%   BMI 26.13 kg/m²     LABS:   Lab Results (most recent)     None          IMAGING:   No Images in the past 120 days found..    EXAM:  Physical Exam  Constitutional:       Appearance: He is well-developed.   HENT:      Head: Normocephalic and atraumatic.   Eyes:      Pupils: Pupils are equal, round, and reactive to light.   Neck:      Thyroid: No thyromegaly.      Vascular: No JVD.   Cardiovascular:      Rate and Rhythm: Normal rate and regular rhythm.      Chest Wall: PMI is not displaced.      Pulses: Normal pulses.      Heart sounds: Normal heart sounds, S1 normal and S2 normal. No murmur heard.  No friction rub. No gallop.       Comments: Healed thoracotomy scar  Pulmonary:      Effort: Pulmonary effort is normal. No respiratory distress.      Breath sounds: Normal breath sounds. No stridor. No wheezing or rales.   Chest:      Chest wall: No tenderness.   Abdominal:      General: Bowel sounds are normal. There is no distension.      Palpations: Abdomen is soft. There is no mass.      Tenderness: There is no abdominal tenderness. There is no guarding or rebound.   Musculoskeletal:      Cervical back: Neck supple. No edema.   Skin:     General: Skin is warm and dry.      Coloration: Skin is not pale.      Findings: No erythema or rash.   Neurological:      Mental Status: He is alert and oriented to person, place, and time.      Cranial Nerves: No cranial nerve deficit.      Coordination: Coordination normal.   Psychiatric:         Behavior: Behavior normal.         Procedure   Procedures       Assessment/Plan "     Diagnoses and all orders for this visit:    1. Primary hypertension (Primary)    2. S/P CABG (coronary artery bypass graft)    3. Mixed hyperlipidemia  -     Lipid Panel; Future  -     Comprehensive Metabolic Panel; Future    4. Atherosclerosis of native coronary artery of native heart without angina pectoris  -     nitroglycerin (NITROSTAT) 0.4 MG SL tablet; Place 1 tablet under the tongue Every 5 (Five) Minutes As Needed for Chest Pain. Take no more than 3 doses in 15 minutes.  Dispense: 25 tablet; Refill: 3    5. Asymptomatic PVD (peripheral vascular disease) (HCC)  -     cilostazol (PLETAL) 100 MG tablet; Take 1 tablet by mouth 2 (Two) Times a Day.  Dispense: 180 tablet; Refill: 1    6. MUSTAPHA on CPAP    7. Hyperlipidemia, unspecified hyperlipidemia type  -     Discontinue: rosuvastatin (CRESTOR) 40 MG tablet; Take 1 tablet by mouth Daily.  Dispense: 90 tablet; Refill: 1  -     rosuvastatin (CRESTOR) 40 MG tablet; Take 1 tablet by mouth Daily.  Dispense: 90 tablet; Refill: 1    8. Hypertension, unspecified type  -     lisinopril-hydrochlorothiazide (PRINZIDE,ZESTORETIC) 20-25 MG per tablet; Take 1 tablet by mouth Daily.  Dispense: 90 tablet; Refill: 1  -     metoprolol succinate XL (TOPROL-XL) 100 MG 24 hr tablet; Take 1 tablet by mouth Daily.  Dispense: 90 tablet; Refill: 1    9. Precordial pain  -     nitroglycerin (NITROSTAT) 0.4 MG SL tablet; Place 1 tablet under the tongue Every 5 (Five) Minutes As Needed for Chest Pain. Take no more than 3 doses in 15 minutes.  Dispense: 25 tablet; Refill: 3    1. His blood pressure on arrival was a little bit elevated but repeating the blood pressure is better will monitor we will continue with the current management for now  2. I have reviewed the labs with unremarkable CMP except for a slight elevated glucose unremarkable CBC but the lipid profile showed elevated LDL of 110 and low HDL of 39 some going to increase the simvastatin to 40 mg/day as he said he is compliant  with medications  3. No chest pain he is active we will continue current management  4. He is using CPAP every night continue current management    Return in about 6 months (around 4/22/2022).      ACP discussion was held with the patient during this visit. Patient does not have an advance directive, declines further assistance.          MEDS ORDERED DURING VISIT:  New Medications Ordered This Visit   Medications   • lisinopril-hydrochlorothiazide (PRINZIDE,ZESTORETIC) 20-25 MG per tablet     Sig: Take 1 tablet by mouth Daily.     Dispense:  90 tablet     Refill:  1   • metoprolol succinate XL (TOPROL-XL) 100 MG 24 hr tablet     Sig: Take 1 tablet by mouth Daily.     Dispense:  90 tablet     Refill:  1   • rosuvastatin (CRESTOR) 40 MG tablet     Sig: Take 1 tablet by mouth Daily.     Dispense:  90 tablet     Refill:  1   • cilostazol (PLETAL) 100 MG tablet     Sig: Take 1 tablet by mouth 2 (Two) Times a Day.     Dispense:  180 tablet     Refill:  1   • nitroglycerin (NITROSTAT) 0.4 MG SL tablet     Sig: Place 1 tablet under the tongue Every 5 (Five) Minutes As Needed for Chest Pain. Take no more than 3 doses in 15 minutes.     Dispense:  25 tablet     Refill:  3       As always, Lowell Tam APRN  I appreciate very much the opportunity to participate in the cardiovascular care of your patients. Please do not hesitate to call me with any questions with regards to Ryland Trujillo evaluation and management.         This document has been electronically signed by Patricia Shaffer MD  October 22, 2021 10:12 EDT

## 2022-03-24 DIAGNOSIS — M25.511 RIGHT SHOULDER PAIN, UNSPECIFIED CHRONICITY: Primary | ICD-10-CM

## 2022-03-28 ENCOUNTER — HOSPITAL ENCOUNTER (OUTPATIENT)
Dept: GENERAL RADIOLOGY | Facility: HOSPITAL | Age: 72
Discharge: HOME | End: 2022-03-28
Admitting: ORTHOPAEDIC SURGERY

## 2022-03-28 ENCOUNTER — OFFICE VISIT (OUTPATIENT)
Dept: ORTHOPEDIC SURGERY | Facility: CLINIC | Age: 72
End: 2022-03-28

## 2022-03-28 VITALS
BODY MASS INDEX: 25.77 KG/M2 | HEIGHT: 69 IN | HEART RATE: 82 BPM | WEIGHT: 174 LBS | SYSTOLIC BLOOD PRESSURE: 125 MMHG | DIASTOLIC BLOOD PRESSURE: 77 MMHG

## 2022-03-28 DIAGNOSIS — M75.111 NONTRAUMATIC INCOMPLETE TEAR OF RIGHT ROTATOR CUFF: Primary | ICD-10-CM

## 2022-03-28 DIAGNOSIS — S46.211A TRAUMATIC PARTIAL TEAR OF BICEPS TENDON, RIGHT, INITIAL ENCOUNTER: ICD-10-CM

## 2022-03-28 DIAGNOSIS — M25.511 RIGHT SHOULDER PAIN, UNSPECIFIED CHRONICITY: ICD-10-CM

## 2022-03-28 PROCEDURE — 73030 X-RAY EXAM OF SHOULDER: CPT

## 2022-03-28 PROCEDURE — 99203 OFFICE O/P NEW LOW 30 MIN: CPT | Performed by: ORTHOPAEDIC SURGERY

## 2022-03-28 PROCEDURE — 73030 X-RAY EXAM OF SHOULDER: CPT | Performed by: RADIOLOGY

## 2022-03-28 PROCEDURE — 20610 DRAIN/INJ JOINT/BURSA W/O US: CPT | Performed by: ORTHOPAEDIC SURGERY

## 2022-03-28 RX ORDER — IBUPROFEN 200 MG
200 TABLET ORAL EVERY 6 HOURS PRN
COMMUNITY

## 2022-03-28 RX ADMIN — METHYLPREDNISOLONE ACETATE 40 MG: 40 INJECTION, SUSPENSION INTRA-ARTICULAR; INTRALESIONAL; INTRAMUSCULAR; SOFT TISSUE at 17:13

## 2022-03-28 RX ADMIN — LIDOCAINE HYDROCHLORIDE 5 ML: 10 INJECTION, SOLUTION EPIDURAL; INFILTRATION; INTRACAUDAL; PERINEURAL at 17:13

## 2022-04-11 RX ORDER — METHYLPREDNISOLONE ACETATE 40 MG/ML
40 INJECTION, SUSPENSION INTRA-ARTICULAR; INTRALESIONAL; INTRAMUSCULAR; SOFT TISSUE
Status: COMPLETED | OUTPATIENT
Start: 2022-03-28 | End: 2022-03-28

## 2022-04-11 RX ORDER — LIDOCAINE HYDROCHLORIDE 10 MG/ML
5 INJECTION, SOLUTION EPIDURAL; INFILTRATION; INTRACAUDAL; PERINEURAL
Status: COMPLETED | OUTPATIENT
Start: 2022-03-28 | End: 2022-03-28

## 2022-04-22 ENCOUNTER — OFFICE VISIT (OUTPATIENT)
Dept: CARDIOLOGY | Facility: CLINIC | Age: 72
End: 2022-04-22

## 2022-04-22 VITALS
SYSTOLIC BLOOD PRESSURE: 139 MMHG | HEART RATE: 84 BPM | DIASTOLIC BLOOD PRESSURE: 77 MMHG | HEIGHT: 69 IN | WEIGHT: 173.4 LBS | BODY MASS INDEX: 25.68 KG/M2 | OXYGEN SATURATION: 97 %

## 2022-04-22 DIAGNOSIS — I73.9 ASYMPTOMATIC PVD (PERIPHERAL VASCULAR DISEASE): ICD-10-CM

## 2022-04-22 DIAGNOSIS — I10 HYPERTENSION, UNSPECIFIED TYPE: ICD-10-CM

## 2022-04-22 DIAGNOSIS — E78.5 HYPERLIPIDEMIA, UNSPECIFIED HYPERLIPIDEMIA TYPE: ICD-10-CM

## 2022-04-22 DIAGNOSIS — I10 PRIMARY HYPERTENSION: ICD-10-CM

## 2022-04-22 DIAGNOSIS — R07.2 PRECORDIAL PAIN: ICD-10-CM

## 2022-04-22 DIAGNOSIS — I25.10 ATHEROSCLEROSIS OF NATIVE CORONARY ARTERY OF NATIVE HEART WITHOUT ANGINA PECTORIS: Primary | ICD-10-CM

## 2022-04-22 DIAGNOSIS — G47.33 OSA ON CPAP: ICD-10-CM

## 2022-04-22 DIAGNOSIS — Z95.1 S/P CABG (CORONARY ARTERY BYPASS GRAFT): ICD-10-CM

## 2022-04-22 DIAGNOSIS — E78.2 MIXED HYPERLIPIDEMIA: ICD-10-CM

## 2022-04-22 DIAGNOSIS — Z99.89 OSA ON CPAP: ICD-10-CM

## 2022-04-22 PROCEDURE — 99214 OFFICE O/P EST MOD 30 MIN: CPT | Performed by: SPECIALIST

## 2022-04-22 RX ORDER — METOPROLOL SUCCINATE 100 MG/1
100 TABLET, EXTENDED RELEASE ORAL DAILY
Qty: 90 TABLET | Refills: 1 | Status: SHIPPED | OUTPATIENT
Start: 2022-04-22 | End: 2022-08-17 | Stop reason: SDUPTHER

## 2022-04-22 RX ORDER — NITROGLYCERIN 0.4 MG/1
0.4 TABLET SUBLINGUAL
Qty: 25 TABLET | Refills: 3 | Status: SHIPPED | OUTPATIENT
Start: 2022-04-22 | End: 2022-08-17 | Stop reason: SDUPTHER

## 2022-04-22 RX ORDER — ROSUVASTATIN CALCIUM 40 MG/1
40 TABLET, COATED ORAL DAILY
Qty: 90 TABLET | Refills: 1 | Status: SHIPPED | OUTPATIENT
Start: 2022-04-22 | End: 2022-08-17 | Stop reason: SDUPTHER

## 2022-04-22 RX ORDER — OMEPRAZOLE 20 MG/1
20 CAPSULE, DELAYED RELEASE ORAL DAILY
Qty: 90 CAPSULE | Refills: 1 | Status: SHIPPED | OUTPATIENT
Start: 2022-04-22 | End: 2022-12-07 | Stop reason: SDUPTHER

## 2022-04-22 RX ORDER — EZETIMIBE 10 MG/1
10 TABLET ORAL DAILY
Qty: 90 TABLET | Refills: 1 | Status: SHIPPED | OUTPATIENT
Start: 2022-04-22 | End: 2022-08-17 | Stop reason: SDUPTHER

## 2022-04-22 RX ORDER — CILOSTAZOL 100 MG/1
100 TABLET ORAL 2 TIMES DAILY
Qty: 180 TABLET | Refills: 1 | Status: SHIPPED | OUTPATIENT
Start: 2022-04-22 | End: 2022-08-17 | Stop reason: SDUPTHER

## 2022-04-22 RX ORDER — LISINOPRIL AND HYDROCHLOROTHIAZIDE 25; 20 MG/1; MG/1
1 TABLET ORAL DAILY
Qty: 90 TABLET | Refills: 1 | Status: SHIPPED | OUTPATIENT
Start: 2022-04-22 | End: 2022-08-17 | Stop reason: SDUPTHER

## 2022-04-22 NOTE — PROGRESS NOTES
Subjective   Follow up, hypertension, CAD  Ryland Trujillo is a 71 y.o. male who presents to day for Follow-up (Here for 6 mo. F/u), Coronary Artery Disease, Hyperlipidemia, Hypertension, and Sleep Apnea.    CHIEF COMPLIANT  Chief Complaint   Patient presents with   • Follow-up     Here for 6 mo. F/u   • Coronary Artery Disease   • Hyperlipidemia   • Hypertension   • Sleep Apnea     Problem list:       1. Hypertension    2. Hyperlipidemia    3. Acid reflux     4. MUSTAPHA with cpap use    Problem List Items Addressed This Visit        Cardiac and Vasculature    Hyperlipidemia    Relevant Medications    ezetimibe (ZETIA) 10 MG tablet    rosuvastatin (CRESTOR) 40 MG tablet    Other Relevant Orders    Lipid Panel    Comprehensive Metabolic Panel    HTN (hypertension)    Relevant Medications    lisinopril-hydrochlorothiazide (PRINZIDE,ZESTORETIC) 20-25 MG per tablet    metoprolol succinate XL (TOPROL-XL) 100 MG 24 hr tablet    Atherosclerosis of native coronary artery of native heart without angina pectoris - Primary    Relevant Medications    cilostazol (PLETAL) 100 MG tablet    metoprolol succinate XL (TOPROL-XL) 100 MG 24 hr tablet    nitroglycerin (NITROSTAT) 0.4 MG SL tablet    omeprazole (priLOSEC) 20 MG capsule    S/P CABG (coronary artery bypass graft)    Asymptomatic PVD (peripheral vascular disease) (HCC)    Relevant Medications    cilostazol (PLETAL) 100 MG tablet       Sleep    MUSTAPHA on CPAP      Other Visit Diagnoses     Precordial pain        Relevant Medications    nitroglycerin (NITROSTAT) 0.4 MG SL tablet          HPI  Is having a lot of right shoulder pain he is going to see orthopedic surgeon for assessment and for possible shots cardiac wise he denies any chest pain no edema no shortness of breath but he really occasionally gets palpitations but this does not happen very often using CPAP every night                    PRIOR MEDS  Current Outpatient Medications on File Prior to Visit   Medication Sig Dispense  Refill   • aspirin 81 MG EC tablet Take 1 tablet by mouth Daily. 90 tablet 1   • coenzyme Q10 100 MG capsule Take 2 capsules by mouth Daily. 90 capsule 1   • ibuprofen (ADVIL,MOTRIN) 200 MG tablet Take 200 mg by mouth Every 6 (Six) Hours As Needed for Mild Pain .     • [DISCONTINUED] cilostazol (PLETAL) 100 MG tablet Take 1 tablet by mouth 2 (Two) Times a Day. 180 tablet 1   • [DISCONTINUED] lisinopril-hydrochlorothiazide (PRINZIDE,ZESTORETIC) 20-25 MG per tablet Take 1 tablet by mouth Daily. 90 tablet 1   • [DISCONTINUED] metoprolol succinate XL (TOPROL-XL) 100 MG 24 hr tablet Take 1 tablet by mouth Daily. 90 tablet 1   • [DISCONTINUED] omeprazole (priLOSEC) 20 MG capsule Take 1 capsule by mouth Daily. 90 capsule 1   • [DISCONTINUED] rosuvastatin (CRESTOR) 40 MG tablet Take 1 tablet by mouth Daily. 90 tablet 1   • [DISCONTINUED] nitroglycerin (NITROSTAT) 0.4 MG SL tablet Place 1 tablet under the tongue Every 5 (Five) Minutes As Needed for Chest Pain. Take no more than 3 doses in 15 minutes. 25 tablet 3     No current facility-administered medications on file prior to visit.       ALLERGIES  Patient has no known allergies.    HISTORY  Past Medical History:   Diagnosis Date   • Acid reflux    • CAD (coronary artery disease)    • HTN (hypertension)    • Hyperlipidemia    • PVD (peripheral vascular disease) (HCC)    • Rheumatoid arthritis (HCC)    • Sleep apnea        Social History     Socioeconomic History   • Marital status:    Tobacco Use   • Smoking status: Former Smoker     Packs/day: 1.00     Years: 16.00     Pack years: 16.00     Types: Cigarettes     Quit date:      Years since quittin.3   • Smokeless tobacco: Never Used   Substance and Sexual Activity   • Alcohol use: Yes   • Drug use: Never   • Sexual activity: Defer       Family History   Problem Relation Age of Onset   • Heart disease Mother    • Hypertension Father    • Hyperlipidemia Father        Review of Systems   Constitutional:  "Negative.    HENT: Negative.    Eyes: Positive for visual disturbance (glasses prn).   Respiratory: Negative.    Cardiovascular: Negative.    Gastrointestinal: Negative.    Endocrine: Negative.    Genitourinary: Negative.    Musculoskeletal: Positive for arthralgias and myalgias.   Skin: Negative.    Allergic/Immunologic: Negative.    Neurological: Negative.    Hematological: Negative.    Psychiatric/Behavioral: Positive for sleep disturbance (off and on).       Objective     VITALS: /77 (BP Location: Left arm, Patient Position: Sitting)   Pulse 84   Ht 175.3 cm (69.02\")   Wt 78.7 kg (173 lb 6.4 oz)   SpO2 97%   BMI 25.59 kg/m²     LABS:   Lab Results (most recent)     None          IMAGING:   XR Shoulder 2+ View Right    Result Date: 3/28/2022    No acute findings in the right shoulder.  This report was finalized on 3/28/2022 2:23 PM by Dr. Ej Rodriguez MD.        EXAM:  Physical Exam  Constitutional:       Appearance: He is well-developed.   HENT:      Head: Normocephalic and atraumatic.   Eyes:      Pupils: Pupils are equal, round, and reactive to light.   Neck:      Thyroid: No thyromegaly.      Vascular: No JVD.   Cardiovascular:      Rate and Rhythm: Normal rate and regular rhythm.      Chest Wall: PMI is not displaced.      Pulses: Normal pulses.      Heart sounds: Normal heart sounds, S1 normal and S2 normal. No murmur heard.    No friction rub. No gallop.      Comments: Healed thoracotomy scar  Pulmonary:      Effort: Pulmonary effort is normal. No respiratory distress.      Breath sounds: Normal breath sounds. No stridor. No wheezing or rales.   Chest:      Chest wall: No tenderness.   Abdominal:      General: Bowel sounds are normal. There is no distension.      Palpations: Abdomen is soft. There is no mass.      Tenderness: There is no abdominal tenderness. There is no guarding or rebound.   Musculoskeletal:      Cervical back: Neck supple. No edema.   Skin:     General: Skin is warm and " dry.      Coloration: Skin is not pale.      Findings: No erythema or rash.   Neurological:      Mental Status: He is alert and oriented to person, place, and time.      Cranial Nerves: No cranial nerve deficit.      Coordination: Coordination normal.   Psychiatric:         Behavior: Behavior normal.         Procedure   Procedures       Assessment/Plan     Diagnoses and all orders for this visit:    1. Atherosclerosis of native coronary artery of native heart without angina pectoris (Primary)  -     nitroglycerin (NITROSTAT) 0.4 MG SL tablet; Place 1 tablet under the tongue Every 5 (Five) Minutes As Needed for Chest Pain. Take no more than 3 doses in 15 minutes.  Dispense: 25 tablet; Refill: 3  -     omeprazole (priLOSEC) 20 MG capsule; Take 1 capsule by mouth Daily.  Dispense: 90 capsule; Refill: 1    2. Primary hypertension    3. Mixed hyperlipidemia  -     ezetimibe (ZETIA) 10 MG tablet; Take 1 tablet by mouth Daily.  Dispense: 90 tablet; Refill: 1  -     Lipid Panel; Future  -     Comprehensive Metabolic Panel; Future    4. S/P CABG (coronary artery bypass graft)    5. MUSTAPHA on CPAP    6. Hyperlipidemia, unspecified hyperlipidemia type  -     rosuvastatin (CRESTOR) 40 MG tablet; Take 1 tablet by mouth Daily.  Dispense: 90 tablet; Refill: 1    7. Asymptomatic PVD (peripheral vascular disease) (HCC)  -     cilostazol (PLETAL) 100 MG tablet; Take 1 tablet by mouth 2 (Two) Times a Day.  Dispense: 180 tablet; Refill: 1    8. Hypertension, unspecified type  -     lisinopril-hydrochlorothiazide (PRINZIDE,ZESTORETIC) 20-25 MG per tablet; Take 1 tablet by mouth Daily.  Dispense: 90 tablet; Refill: 1  -     metoprolol succinate XL (TOPROL-XL) 100 MG 24 hr tablet; Take 1 tablet by mouth Daily.  Dispense: 90 tablet; Refill: 1    9. Precordial pain  -     nitroglycerin (NITROSTAT) 0.4 MG SL tablet; Place 1 tablet under the tongue Every 5 (Five) Minutes As Needed for Chest Pain. Take no more than 3 doses in 15 minutes.   Dispense: 25 tablet; Refill: 3    1.  No recent chest pain we will continue current management  2.  His blood pressure is borderline we will monitor for now  3.  I reviewed the labs from his primary care physician and that showed slight elevated fasting glucose of 101 and LDL of 111 with triglycerides 116 and HDL of 41 he said he is compliant with the rosuvastatin so I am going to add also zetia 10 mg/day and will monitor his lipid profile  4.  He is using CPAP every night we will continue current management    Return in about 3 months (around 7/22/2022).      Advance Care Planning   ACP discussion was held with the patient during this visit. Patient does not have an advance directive, declines further assistance.          MEDS ORDERED DURING VISIT:  New Medications Ordered This Visit   Medications   • ezetimibe (ZETIA) 10 MG tablet     Sig: Take 1 tablet by mouth Daily.     Dispense:  90 tablet     Refill:  1   • rosuvastatin (CRESTOR) 40 MG tablet     Sig: Take 1 tablet by mouth Daily.     Dispense:  90 tablet     Refill:  1   • cilostazol (PLETAL) 100 MG tablet     Sig: Take 1 tablet by mouth 2 (Two) Times a Day.     Dispense:  180 tablet     Refill:  1   • lisinopril-hydrochlorothiazide (PRINZIDE,ZESTORETIC) 20-25 MG per tablet     Sig: Take 1 tablet by mouth Daily.     Dispense:  90 tablet     Refill:  1   • metoprolol succinate XL (TOPROL-XL) 100 MG 24 hr tablet     Sig: Take 1 tablet by mouth Daily.     Dispense:  90 tablet     Refill:  1   • nitroglycerin (NITROSTAT) 0.4 MG SL tablet     Sig: Place 1 tablet under the tongue Every 5 (Five) Minutes As Needed for Chest Pain. Take no more than 3 doses in 15 minutes.     Dispense:  25 tablet     Refill:  3   • omeprazole (priLOSEC) 20 MG capsule     Sig: Take 1 capsule by mouth Daily.     Dispense:  90 capsule     Refill:  1       As always, Lowell Tam APRN  I appreciate very much the opportunity to participate in the cardiovascular care of your patients.  Please do not hesitate to call me with any questions with regards to Ryland Trujillo evaluation and management.         This document has been electronically signed by Patricia Shaffer MD  April 22, 2022 13:48 EDT

## 2022-04-25 ENCOUNTER — OFFICE VISIT (OUTPATIENT)
Dept: ORTHOPEDIC SURGERY | Facility: CLINIC | Age: 72
End: 2022-04-25

## 2022-04-25 VITALS — BODY MASS INDEX: 25.62 KG/M2 | HEIGHT: 69 IN | WEIGHT: 173 LBS

## 2022-04-25 DIAGNOSIS — S46.211A TRAUMATIC PARTIAL TEAR OF BICEPS TENDON, RIGHT, INITIAL ENCOUNTER: ICD-10-CM

## 2022-04-25 DIAGNOSIS — M25.511 RIGHT SHOULDER PAIN, UNSPECIFIED CHRONICITY: Primary | ICD-10-CM

## 2022-04-25 DIAGNOSIS — M75.111 NONTRAUMATIC INCOMPLETE TEAR OF RIGHT ROTATOR CUFF: ICD-10-CM

## 2022-04-25 PROCEDURE — 99213 OFFICE O/P EST LOW 20 MIN: CPT | Performed by: ORTHOPAEDIC SURGERY

## 2022-04-25 NOTE — PROGRESS NOTES
Follow-up Visit         Patient: Ryland Trujillo  YOB: 1950  Date of Encounter: 2022      Chief  Complaint:   Chief Complaint   Patient presents with   • Right Shoulder - Follow-up, Pain         HPI:  Ryland Trujillo, 71 y.o. male presents in follow-up right shoulder pain which began about 5 months ago without injury.  He was last seen 2022 provided steroid injection along the biceps tendon.  He reports significant improvement in his shoulder pain only has occasional discomfort.  We did not obtain MRI.        Medical History:  Patient Active Problem List   Diagnosis   • Hyperlipidemia   • HTN (hypertension)   • Acid reflux   • MUSTAPHA on CPAP   • Atherosclerosis of native coronary artery of native heart without angina pectoris   • S/P CABG (coronary artery bypass graft)   • Asymptomatic PVD (peripheral vascular disease) (HCC)   • Cigarette nicotine dependence in remission     Past Medical History:   Diagnosis Date   • Acid reflux    • CAD (coronary artery disease)    • HTN (hypertension)    • Hyperlipidemia    • PVD (peripheral vascular disease) (HCC)    • Rheumatoid arthritis (HCC)    • Sleep apnea          Social History:  Social History     Socioeconomic History   • Marital status:    Tobacco Use   • Smoking status: Former Smoker     Packs/day: 1.00     Years: 16.00     Pack years: 16.00     Types: Cigarettes     Quit date:      Years since quittin.3   • Smokeless tobacco: Never Used   Vaping Use   • Vaping Use: Never used   Substance and Sexual Activity   • Alcohol use: Yes   • Drug use: Never   • Sexual activity: Defer         Current Medications:    Current Outpatient Medications:   •  aspirin 81 MG EC tablet, Take 1 tablet by mouth Daily., Disp: 90 tablet, Rfl: 1  •  cilostazol (PLETAL) 100 MG tablet, Take 1 tablet by mouth 2 (Two) Times a Day., Disp: 180 tablet, Rfl: 1  •  coenzyme Q10 100 MG capsule, Take 2 capsules by mouth Daily., Disp: 90 capsule, Rfl: 1  •   ezetimibe (ZETIA) 10 MG tablet, Take 1 tablet by mouth Daily., Disp: 90 tablet, Rfl: 1  •  ibuprofen (ADVIL,MOTRIN) 200 MG tablet, Take 200 mg by mouth Every 6 (Six) Hours As Needed for Mild Pain ., Disp: , Rfl:   •  lisinopril-hydrochlorothiazide (PRINZIDE,ZESTORETIC) 20-25 MG per tablet, Take 1 tablet by mouth Daily., Disp: 90 tablet, Rfl: 1  •  metoprolol succinate XL (TOPROL-XL) 100 MG 24 hr tablet, Take 1 tablet by mouth Daily., Disp: 90 tablet, Rfl: 1  •  nitroglycerin (NITROSTAT) 0.4 MG SL tablet, Place 1 tablet under the tongue Every 5 (Five) Minutes As Needed for Chest Pain. Take no more than 3 doses in 15 minutes., Disp: 25 tablet, Rfl: 3  •  omeprazole (priLOSEC) 20 MG capsule, Take 1 capsule by mouth Daily., Disp: 90 capsule, Rfl: 1  •  rosuvastatin (CRESTOR) 40 MG tablet, Take 1 tablet by mouth Daily., Disp: 90 tablet, Rfl: 1      Allergies:  No Known Allergies      Family History:  Family History   Problem Relation Age of Onset   • Heart disease Mother    • Hypertension Father    • Hyperlipidemia Father          Surgical History:  Past Surgical History:   Procedure Laterality Date   • THYROID SURGERY      Right thyroid removal          Radiology:   XR Shoulder 2+ View Right    Result Date: 3/28/2022    No acute findings in the right shoulder.  This report was finalized on 3/28/2022 2:23 PM by Dr. Ej Rodriguez MD.          Examination:   Examination right shoulder reveals full motion with mild discomfort upon forward elevation.  With Jobes test he has good strength with minimal pain.  Moderate pain with speeds maneuver and mild discomfort to palpation along the biceps tendon proximally.  No localized tenderness to the AC joint neurovascular exam is grossly intact.        Assessment & Plan:   71 y.o. male presents right shoulder complaints currently doing reasonably well.  Strongly suspicious that his symptoms are generated from his proximal biceps tendon.  May experience further tearing.  He is  encouraged to return in the future as symptoms warrant.  For now he is doing reasonably well and we will hold off on MRI.         Diagnosis Plan   1. Right shoulder pain, unspecified chronicity     2. Nontraumatic incomplete tear of right rotator cuff     3. Traumatic partial tear of biceps tendon, right, initial encounter         Cc:  Lowell Tam APRN              This document has been electronically signed by Antolin Miller MD   April 25, 2022 23:09 EDT

## 2022-08-17 ENCOUNTER — OFFICE VISIT (OUTPATIENT)
Dept: CARDIOLOGY | Facility: CLINIC | Age: 72
End: 2022-08-17

## 2022-08-17 VITALS
OXYGEN SATURATION: 98 % | WEIGHT: 169 LBS | SYSTOLIC BLOOD PRESSURE: 113 MMHG | BODY MASS INDEX: 24.96 KG/M2 | HEART RATE: 75 BPM | DIASTOLIC BLOOD PRESSURE: 65 MMHG

## 2022-08-17 DIAGNOSIS — F17.211 CIGARETTE NICOTINE DEPENDENCE IN REMISSION: ICD-10-CM

## 2022-08-17 DIAGNOSIS — E78.5 HYPERLIPIDEMIA, UNSPECIFIED HYPERLIPIDEMIA TYPE: ICD-10-CM

## 2022-08-17 DIAGNOSIS — G47.33 OSA ON CPAP: ICD-10-CM

## 2022-08-17 DIAGNOSIS — Z99.89 OSA ON CPAP: ICD-10-CM

## 2022-08-17 DIAGNOSIS — E78.2 MIXED HYPERLIPIDEMIA: ICD-10-CM

## 2022-08-17 DIAGNOSIS — I73.9 ASYMPTOMATIC PVD (PERIPHERAL VASCULAR DISEASE): ICD-10-CM

## 2022-08-17 DIAGNOSIS — R07.2 PRECORDIAL PAIN: ICD-10-CM

## 2022-08-17 DIAGNOSIS — I25.10 ATHEROSCLEROSIS OF NATIVE CORONARY ARTERY OF NATIVE HEART WITHOUT ANGINA PECTORIS: ICD-10-CM

## 2022-08-17 DIAGNOSIS — I10 PRIMARY HYPERTENSION: ICD-10-CM

## 2022-08-17 DIAGNOSIS — I10 HYPERTENSION, UNSPECIFIED TYPE: ICD-10-CM

## 2022-08-17 DIAGNOSIS — I49.3 MULTIPLE PREMATURE VENTRICULAR COMPLEXES: Primary | ICD-10-CM

## 2022-08-17 PROCEDURE — 99214 OFFICE O/P EST MOD 30 MIN: CPT | Performed by: SPECIALIST

## 2022-08-17 PROCEDURE — 93000 ELECTROCARDIOGRAM COMPLETE: CPT | Performed by: SPECIALIST

## 2022-08-17 RX ORDER — NITROGLYCERIN 0.4 MG/1
0.4 TABLET SUBLINGUAL
Qty: 25 TABLET | Refills: 3 | Status: SHIPPED | OUTPATIENT
Start: 2022-08-17 | End: 2022-12-07 | Stop reason: SDUPTHER

## 2022-08-17 RX ORDER — ROSUVASTATIN CALCIUM 40 MG/1
40 TABLET, COATED ORAL DAILY
Qty: 90 TABLET | Refills: 1 | Status: SHIPPED | OUTPATIENT
Start: 2022-08-17 | End: 2022-12-07 | Stop reason: SDUPTHER

## 2022-08-17 RX ORDER — CILOSTAZOL 100 MG/1
100 TABLET ORAL 2 TIMES DAILY
Qty: 180 TABLET | Refills: 1 | Status: SHIPPED | OUTPATIENT
Start: 2022-08-17 | End: 2022-12-07 | Stop reason: SDUPTHER

## 2022-08-17 RX ORDER — METOPROLOL SUCCINATE 100 MG/1
100 TABLET, EXTENDED RELEASE ORAL DAILY
Qty: 90 TABLET | Refills: 1 | Status: SHIPPED | OUTPATIENT
Start: 2022-08-17 | End: 2022-12-07 | Stop reason: SDUPTHER

## 2022-08-17 RX ORDER — LISINOPRIL AND HYDROCHLOROTHIAZIDE 25; 20 MG/1; MG/1
1 TABLET ORAL DAILY
Qty: 90 TABLET | Refills: 1 | Status: SHIPPED | OUTPATIENT
Start: 2022-08-17 | End: 2022-12-07 | Stop reason: SDUPTHER

## 2022-08-17 RX ORDER — EZETIMIBE 10 MG/1
10 TABLET ORAL DAILY
Qty: 90 TABLET | Refills: 1 | Status: SHIPPED | OUTPATIENT
Start: 2022-08-17 | End: 2022-12-07 | Stop reason: SDUPTHER

## 2022-08-17 NOTE — PROGRESS NOTES
Subjective   Follow up, CAD, hypertrension  Ryland Trujillo is a 71 y.o. male who presents to day for Follow-up.    CHIEF COMPLIANT  Chief Complaint   Patient presents with   • Follow-up       Active Problems:  Problem List Items Addressed This Visit        Cardiac and Vasculature    Hyperlipidemia    Relevant Medications    ezetimibe (ZETIA) 10 MG tablet    rosuvastatin (CRESTOR) 40 MG tablet    HTN (hypertension)    Relevant Medications    lisinopril-hydrochlorothiazide (PRINZIDE,ZESTORETIC) 20-25 MG per tablet    metoprolol succinate XL (TOPROL-XL) 100 MG 24 hr tablet    Atherosclerosis of native coronary artery of native heart without angina pectoris    Relevant Medications    cilostazol (PLETAL) 100 MG tablet    metoprolol succinate XL (TOPROL-XL) 100 MG 24 hr tablet    nitroglycerin (NITROSTAT) 0.4 MG SL tablet    Other Relevant Orders    Adult Transthoracic Echo Complete w/ Color, Spectral and Contrast if necessary per protocol    Asymptomatic PVD (peripheral vascular disease) (HCC)    Relevant Medications    cilostazol (PLETAL) 100 MG tablet    Multiple premature ventricular complexes - Primary    Relevant Medications    cilostazol (PLETAL) 100 MG tablet    metoprolol succinate XL (TOPROL-XL) 100 MG 24 hr tablet    nitroglycerin (NITROSTAT) 0.4 MG SL tablet    Other Relevant Orders    Adult Transthoracic Echo Complete w/ Color, Spectral and Contrast if necessary per protocol    Holter Monitor - 24 Hour       Sleep    MUSTAPHA on CPAP       Tobacco    Cigarette nicotine dependence in remission      Other Visit Diagnoses     Precordial pain        Relevant Medications    nitroglycerin (NITROSTAT) 0.4 MG SL tablet      Problem list:       1. Hypertension    2. Hyperlipidemia    3. Acid reflux     4. MUSTAPHA with cpap use    HPI  HPI         He has been doing very well he is active physically is denying any chest pain no shortness of breath no dizziness no palpitations no edema he is using the CPAP every  night                  PRIOR MEDS  Current Outpatient Medications on File Prior to Visit   Medication Sig Dispense Refill   • aspirin 81 MG EC tablet Take 1 tablet by mouth Daily. 90 tablet 1   • coenzyme Q10 100 MG capsule Take 2 capsules by mouth Daily. 90 capsule 1   • ibuprofen (ADVIL,MOTRIN) 200 MG tablet Take 200 mg by mouth Every 6 (Six) Hours As Needed for Mild Pain .     • omeprazole (priLOSEC) 20 MG capsule Take 1 capsule by mouth Daily. 90 capsule 1   • [DISCONTINUED] cilostazol (PLETAL) 100 MG tablet Take 1 tablet by mouth 2 (Two) Times a Day. 180 tablet 1   • [DISCONTINUED] ezetimibe (ZETIA) 10 MG tablet Take 1 tablet by mouth Daily. 90 tablet 1   • [DISCONTINUED] lisinopril-hydrochlorothiazide (PRINZIDE,ZESTORETIC) 20-25 MG per tablet Take 1 tablet by mouth Daily. 90 tablet 1   • [DISCONTINUED] metoprolol succinate XL (TOPROL-XL) 100 MG 24 hr tablet Take 1 tablet by mouth Daily. 90 tablet 1   • [DISCONTINUED] nitroglycerin (NITROSTAT) 0.4 MG SL tablet Place 1 tablet under the tongue Every 5 (Five) Minutes As Needed for Chest Pain. Take no more than 3 doses in 15 minutes. 25 tablet 3   • [DISCONTINUED] rosuvastatin (CRESTOR) 40 MG tablet Take 1 tablet by mouth Daily. 90 tablet 1     No current facility-administered medications on file prior to visit.       ALLERGIES  Patient has no known allergies.    HISTORY  Past Medical History:   Diagnosis Date   • Acid reflux    • CAD (coronary artery disease)    • HTN (hypertension)    • Hyperlipidemia    • PVD (peripheral vascular disease) ([facility])    • Rheumatoid arthritis (HCC)    • Sleep apnea        Social History     Socioeconomic History   • Marital status:    Tobacco Use   • Smoking status: Former Smoker     Packs/day: 1.00     Years: 16.00     Pack years: 16.00     Types: Cigarettes     Quit date:      Years since quittin.6   • Smokeless tobacco: Never Used   Vaping Use   • Vaping Use: Never used   Substance and Sexual Activity   • Alcohol  use: Yes   • Drug use: Never   • Sexual activity: Defer       Family History   Problem Relation Age of Onset   • Heart disease Mother    • Hypertension Father    • Hyperlipidemia Father        Review of Systems   Constitutional: Negative.  Negative for activity change, appetite change, chills, fatigue and fever.   HENT: Negative.    Eyes: Positive for visual disturbance.   Respiratory: Positive for apnea (CPAP). Negative for cough, choking, chest tightness, shortness of breath, wheezing and stridor.    Cardiovascular: Negative.  Negative for chest pain, palpitations and leg swelling.   Gastrointestinal: Negative.  Negative for blood in stool.   Endocrine: Negative.  Negative for cold intolerance and heat intolerance.   Musculoskeletal: Negative.  Negative for arthralgias, back pain, gait problem, joint swelling, neck pain and neck stiffness.   Skin: Negative.  Negative for color change and wound.   Allergic/Immunologic: Negative for environmental allergies and food allergies.   Neurological: Negative.  Negative for dizziness, syncope, weakness, light-headedness, numbness and headaches.   Hematological: Bruises/bleeds easily.   Psychiatric/Behavioral: Negative.  Negative for sleep disturbance.       Objective     VITALS: /65 (BP Location: Left arm, Patient Position: Sitting, Cuff Size: Adult)   Pulse 75   Wt 76.7 kg (169 lb)   SpO2 98%   BMI 24.96 kg/m²     LABS:   Lab Results (most recent)     None          IMAGING:   No Images in the past 120 days found..    EXAM:  Physical Exam  Constitutional:       Appearance: He is well-developed.   HENT:      Head: Normocephalic and atraumatic.   Eyes:      Pupils: Pupils are equal, round, and reactive to light.   Neck:      Thyroid: No thyromegaly.      Vascular: No JVD.   Cardiovascular:      Rate and Rhythm: Normal rate and regular rhythm.      Chest Wall: PMI is not displaced.      Pulses: Normal pulses.      Heart sounds: Normal heart sounds, S1 normal and S2  normal. No murmur heard.    No friction rub. No gallop.      Comments: Healed thoracotomy scar  Premature beats  Pulmonary:      Effort: Pulmonary effort is normal. No respiratory distress.      Breath sounds: Normal breath sounds. No stridor. No wheezing or rales.   Chest:      Chest wall: No tenderness.   Abdominal:      General: Bowel sounds are normal. There is no distension.      Palpations: Abdomen is soft. There is no mass.      Tenderness: There is no abdominal tenderness. There is no guarding or rebound.   Musculoskeletal:      Cervical back: Neck supple. No edema.   Skin:     General: Skin is warm and dry.      Coloration: Skin is not pale.      Findings: No erythema or rash.   Neurological:      Mental Status: He is alert and oriented to person, place, and time.      Cranial Nerves: No cranial nerve deficit.      Coordination: Coordination normal.   Psychiatric:         Behavior: Behavior normal.         Procedure     ECG 12 Lead    Date/Time: 8/17/2022 12:34 PM  Performed by: Patricia Shaffer MD  Authorized by: Patricia Shaffer MD   Comparison: not compared with previous ECG           EKG: Showed normal sinus rhythm old anteroseptal infarction pattern and with PVCs trigeminy's no previous EKG available for comparison       Assessment & Plan     Diagnoses and all orders for this visit:    1. Multiple premature ventricular complexes (Primary)  -     Adult Transthoracic Echo Complete w/ Color, Spectral and Contrast if necessary per protocol; Future  -     Holter Monitor - 24 Hour; Future    2. Atherosclerosis of native coronary artery of native heart without angina pectoris  -     Adult Transthoracic Echo Complete w/ Color, Spectral and Contrast if necessary per protocol; Future  -     nitroglycerin (NITROSTAT) 0.4 MG SL tablet; Place 1 tablet under the tongue Every 5 (Five) Minutes As Needed for Chest Pain. Take no more than 3 doses in 15 minutes.  Dispense: 25 tablet; Refill: 3    3. Primary  hypertension    4. Mixed hyperlipidemia  -     ezetimibe (ZETIA) 10 MG tablet; Take 1 tablet by mouth Daily.  Dispense: 90 tablet; Refill: 1    5. MUSTAPHA on CPAP    6. Cigarette nicotine dependence in remission    7. Asymptomatic PVD (peripheral vascular disease) (HCC)  -     cilostazol (PLETAL) 100 MG tablet; Take 1 tablet by mouth 2 (Two) Times a Day.  Dispense: 180 tablet; Refill: 1    8. Hypertension, unspecified type  -     lisinopril-hydrochlorothiazide (PRINZIDE,ZESTORETIC) 20-25 MG per tablet; Take 1 tablet by mouth Daily.  Dispense: 90 tablet; Refill: 1  -     metoprolol succinate XL (TOPROL-XL) 100 MG 24 hr tablet; Take 1 tablet by mouth Daily.  Dispense: 90 tablet; Refill: 1    9. Precordial pain  -     nitroglycerin (NITROSTAT) 0.4 MG SL tablet; Place 1 tablet under the tongue Every 5 (Five) Minutes As Needed for Chest Pain. Take no more than 3 doses in 15 minutes.  Dispense: 25 tablet; Refill: 3    10. Hyperlipidemia, unspecified hyperlipidemia type  -     rosuvastatin (CRESTOR) 40 MG tablet; Take 1 tablet by mouth Daily.  Dispense: 90 tablet; Refill: 1    Other orders  -     ECG 12 Lead    1.  I am concerned about the frequent premature ventricular complexes he has trigeminy's on the EKG today but he is completely asymptomatic he is denying any palpitations or dizziness however because of the history of his coronary artery disease I am going to go for Holter monitor for assessment of the frequency of the PVCs and also repeat the echo as his echocardiogram was more than 4 years ago to assess for any change  2.  He is denying any chest pain or told he is active we will continue with aspirin current medications  3.  We reviewed the blood work from his primary care physician on the fourth of this month and that showed acceptable lipid profile except for slightly low HDL of 39 and slight anemia otherwise unremarkable labs  4.  He is using CPAP every night we will continue the current management  5.  His blood  pressure is well controlled we will continue with current management    Return in about 3 months (around 11/17/2022).      Advance Care Planning   ACP discussion was declined by the patient. Patient does not have an advance directive, declines further assistance.     Patient brought in medicine list to appointment, it's been reviewed with patient and med list was updated in the chart.              MEDS ORDERED DURING VISIT:  New Medications Ordered This Visit   Medications   • cilostazol (PLETAL) 100 MG tablet     Sig: Take 1 tablet by mouth 2 (Two) Times a Day.     Dispense:  180 tablet     Refill:  1   • ezetimibe (ZETIA) 10 MG tablet     Sig: Take 1 tablet by mouth Daily.     Dispense:  90 tablet     Refill:  1   • lisinopril-hydrochlorothiazide (PRINZIDE,ZESTORETIC) 20-25 MG per tablet     Sig: Take 1 tablet by mouth Daily.     Dispense:  90 tablet     Refill:  1   • metoprolol succinate XL (TOPROL-XL) 100 MG 24 hr tablet     Sig: Take 1 tablet by mouth Daily.     Dispense:  90 tablet     Refill:  1   • nitroglycerin (NITROSTAT) 0.4 MG SL tablet     Sig: Place 1 tablet under the tongue Every 5 (Five) Minutes As Needed for Chest Pain. Take no more than 3 doses in 15 minutes.     Dispense:  25 tablet     Refill:  3   • rosuvastatin (CRESTOR) 40 MG tablet     Sig: Take 1 tablet by mouth Daily.     Dispense:  90 tablet     Refill:  1       As always, Lowell Tam APRN  I appreciate very much the opportunity to participate in the cardiovascular care of your patients. Please do not hesitate to call me with any questions with regards to Ryland Trujillo evaluation and management.         This document has been electronically signed by Patricia Shaffer MD  August 17, 2022 12:41 EDT

## 2022-08-23 DIAGNOSIS — E78.2 MIXED HYPERLIPIDEMIA: ICD-10-CM

## 2022-09-30 ENCOUNTER — HOSPITAL ENCOUNTER (OUTPATIENT)
Dept: CARDIOLOGY | Facility: HOSPITAL | Age: 72
Discharge: HOME | End: 2022-09-30
Admitting: SPECIALIST

## 2022-09-30 DIAGNOSIS — I49.3 MULTIPLE PREMATURE VENTRICULAR COMPLEXES: ICD-10-CM

## 2022-09-30 DIAGNOSIS — I25.10 ATHEROSCLEROSIS OF NATIVE CORONARY ARTERY OF NATIVE HEART WITHOUT ANGINA PECTORIS: ICD-10-CM

## 2022-09-30 LAB
BH CV ECHO MEAS - ACS: 2.28 CM
BH CV ECHO MEAS - AO MAX PG: 7.2 MMHG
BH CV ECHO MEAS - AO MEAN PG: 4 MMHG
BH CV ECHO MEAS - AO ROOT DIAM: 3.6 CM
BH CV ECHO MEAS - AO V2 MAX: 134.4 CM/SEC
BH CV ECHO MEAS - AO V2 VTI: 30.9 CM
BH CV ECHO MEAS - EDV(CUBED): 95.4 ML
BH CV ECHO MEAS - EDV(MOD-SP4): 109 ML
BH CV ECHO MEAS - EF(MOD-SP4): 53.3 %
BH CV ECHO MEAS - ESV(CUBED): 42.9 ML
BH CV ECHO MEAS - ESV(MOD-SP4): 50.9 ML
BH CV ECHO MEAS - FS: 23.4 %
BH CV ECHO MEAS - IVS/LVPW: 0.86 CM
BH CV ECHO MEAS - IVSD: 1.11 CM
BH CV ECHO MEAS - LA DIMENSION: 4.1 CM
BH CV ECHO MEAS - LAT PEAK E' VEL: 7.8 CM/SEC
BH CV ECHO MEAS - LV DIASTOLIC VOL/BSA (35-75): 56.7 CM2
BH CV ECHO MEAS - LV MASS(C)D: 202.3 GRAMS
BH CV ECHO MEAS - LV SYSTOLIC VOL/BSA (12-30): 26.5 CM2
BH CV ECHO MEAS - LVIDD: 4.6 CM
BH CV ECHO MEAS - LVIDS: 3.5 CM
BH CV ECHO MEAS - LVOT AREA: 4.6 CM2
BH CV ECHO MEAS - LVOT DIAM: 2.41 CM
BH CV ECHO MEAS - LVPWD: 1.1 CM
BH CV ECHO MEAS - MED PEAK E' VEL: 6.6 CM/SEC
BH CV ECHO MEAS - MV A MAX VEL: 85.7 CM/SEC
BH CV ECHO MEAS - MV DEC SLOPE: 215.3 CM/SEC2
BH CV ECHO MEAS - MV E MAX VEL: 57 CM/SEC
BH CV ECHO MEAS - MV E/A: 0.67
BH CV ECHO MEAS - RAP SYSTOLE: 10 MMHG
BH CV ECHO MEAS - RVDD: 3.2 CM
BH CV ECHO MEAS - RVSP: 32.3 MMHG
BH CV ECHO MEAS - SI(MOD-SP4): 30.2 ML/M2
BH CV ECHO MEAS - SV(MOD-SP4): 58.1 ML
BH CV ECHO MEAS - TR MAX PG: 22.3 MMHG
BH CV ECHO MEAS - TR MAX VEL: 236.2 CM/SEC
BH CV ECHO MEASUREMENTS AVERAGE E/E' RATIO: 7.92
LEFT ATRIUM VOLUME INDEX: 25.9 ML/M2
MAXIMAL PREDICTED HEART RATE: 150 BPM
STRESS TARGET HR: 128 BPM

## 2022-09-30 PROCEDURE — 93306 TTE W/DOPPLER COMPLETE: CPT | Performed by: SPECIALIST

## 2022-09-30 PROCEDURE — 93306 TTE W/DOPPLER COMPLETE: CPT

## 2022-12-07 ENCOUNTER — OFFICE VISIT (OUTPATIENT)
Dept: CARDIOLOGY | Facility: CLINIC | Age: 72
End: 2022-12-07

## 2022-12-07 VITALS
WEIGHT: 172 LBS | DIASTOLIC BLOOD PRESSURE: 78 MMHG | OXYGEN SATURATION: 96 % | HEIGHT: 69 IN | SYSTOLIC BLOOD PRESSURE: 142 MMHG | HEART RATE: 75 BPM | BODY MASS INDEX: 25.48 KG/M2

## 2022-12-07 DIAGNOSIS — G47.33 OSA ON CPAP: ICD-10-CM

## 2022-12-07 DIAGNOSIS — E78.5 HYPERLIPIDEMIA, UNSPECIFIED HYPERLIPIDEMIA TYPE: ICD-10-CM

## 2022-12-07 DIAGNOSIS — I10 HYPERTENSION, UNSPECIFIED TYPE: ICD-10-CM

## 2022-12-07 DIAGNOSIS — R07.2 PRECORDIAL PAIN: ICD-10-CM

## 2022-12-07 DIAGNOSIS — I73.9 ASYMPTOMATIC PVD (PERIPHERAL VASCULAR DISEASE): ICD-10-CM

## 2022-12-07 DIAGNOSIS — Z95.1 S/P CABG (CORONARY ARTERY BYPASS GRAFT): ICD-10-CM

## 2022-12-07 DIAGNOSIS — E78.2 MIXED HYPERLIPIDEMIA: ICD-10-CM

## 2022-12-07 DIAGNOSIS — I25.10 ATHEROSCLEROSIS OF NATIVE CORONARY ARTERY OF NATIVE HEART WITHOUT ANGINA PECTORIS: ICD-10-CM

## 2022-12-07 DIAGNOSIS — F17.211 CIGARETTE NICOTINE DEPENDENCE IN REMISSION: ICD-10-CM

## 2022-12-07 DIAGNOSIS — I10 PRIMARY HYPERTENSION: ICD-10-CM

## 2022-12-07 DIAGNOSIS — I49.3 MULTIPLE PREMATURE VENTRICULAR COMPLEXES: Primary | ICD-10-CM

## 2022-12-07 DIAGNOSIS — Z99.89 OSA ON CPAP: ICD-10-CM

## 2022-12-07 PROCEDURE — 99214 OFFICE O/P EST MOD 30 MIN: CPT | Performed by: SPECIALIST

## 2022-12-07 RX ORDER — ROSUVASTATIN CALCIUM 40 MG/1
40 TABLET, COATED ORAL DAILY
Qty: 90 TABLET | Refills: 1 | Status: SHIPPED | OUTPATIENT
Start: 2022-12-07

## 2022-12-07 RX ORDER — OMEPRAZOLE 20 MG/1
20 CAPSULE, DELAYED RELEASE ORAL DAILY
Qty: 90 CAPSULE | Refills: 1 | Status: SHIPPED | OUTPATIENT
Start: 2022-12-07

## 2022-12-07 RX ORDER — LISINOPRIL AND HYDROCHLOROTHIAZIDE 25; 20 MG/1; MG/1
1 TABLET ORAL DAILY
Qty: 90 TABLET | Refills: 1 | Status: SHIPPED | OUTPATIENT
Start: 2022-12-07

## 2022-12-07 RX ORDER — EZETIMIBE 10 MG/1
10 TABLET ORAL DAILY
Qty: 90 TABLET | Refills: 1 | Status: SHIPPED | OUTPATIENT
Start: 2022-12-07

## 2022-12-07 RX ORDER — METOPROLOL SUCCINATE 100 MG/1
100 TABLET, EXTENDED RELEASE ORAL DAILY
Qty: 90 TABLET | Refills: 1 | Status: SHIPPED | OUTPATIENT
Start: 2022-12-07

## 2022-12-07 RX ORDER — NITROGLYCERIN 0.4 MG/1
0.4 TABLET SUBLINGUAL
Qty: 25 TABLET | Refills: 3 | Status: SHIPPED | OUTPATIENT
Start: 2022-12-07

## 2022-12-07 RX ORDER — CILOSTAZOL 100 MG/1
100 TABLET ORAL 2 TIMES DAILY
Qty: 180 TABLET | Refills: 1 | Status: SHIPPED | OUTPATIENT
Start: 2022-12-07

## 2022-12-07 NOTE — PROGRESS NOTES
Subjective   Follow up, PVCs, CAD  Ryland Trujillo is a 72 y.o. male who presents to day for Follow-up (3mth).    CHIEF COMPLIANT  Chief Complaint   Patient presents with   • Follow-up     3mth       Active Problems:  Problem List Items Addressed This Visit        Cardiac and Vasculature    Hyperlipidemia    Relevant Medications    ezetimibe (ZETIA) 10 MG tablet    rosuvastatin (CRESTOR) 40 MG tablet    Other Relevant Orders    Lipid Panel    Comprehensive Metabolic Panel    HTN (hypertension)    Relevant Medications    lisinopril-hydrochlorothiazide (PRINZIDE,ZESTORETIC) 20-25 MG per tablet    metoprolol succinate XL (TOPROL-XL) 100 MG 24 hr tablet    Atherosclerosis of native coronary artery of native heart without angina pectoris    Relevant Medications    cilostazol (PLETAL) 100 MG tablet    metoprolol succinate XL (TOPROL-XL) 100 MG 24 hr tablet    nitroglycerin (NITROSTAT) 0.4 MG SL tablet    omeprazole (priLOSEC) 20 MG capsule    S/P CABG (coronary artery bypass graft)    Asymptomatic PVD (peripheral vascular disease) (HCC)    Relevant Medications    cilostazol (PLETAL) 100 MG tablet    Multiple premature ventricular complexes - Primary    Relevant Medications    cilostazol (PLETAL) 100 MG tablet    metoprolol succinate XL (TOPROL-XL) 100 MG 24 hr tablet    nitroglycerin (NITROSTAT) 0.4 MG SL tablet       Sleep    MUSTAPHA on CPAP       Tobacco    Cigarette nicotine dependence in remission   Other Visit Diagnoses     Precordial pain        Relevant Medications    nitroglycerin (NITROSTAT) 0.4 MG SL tablet          HPI  HPI  He has been doing very well he is denying any palpitations no chest pain no edema denying any dizziness he is using the CPAP every night  PRIOR MEDS  Current Outpatient Medications on File Prior to Visit   Medication Sig Dispense Refill   • aspirin 81 MG EC tablet Take 1 tablet by mouth Daily. 90 tablet 1   • coenzyme Q10 100 MG capsule Take 2 capsules by mouth Daily. 90 capsule 1   • ibuprofen  (ADVIL,MOTRIN) 200 MG tablet Take 200 mg by mouth Every 6 (Six) Hours As Needed for Mild Pain .     • [DISCONTINUED] cilostazol (PLETAL) 100 MG tablet Take 1 tablet by mouth 2 (Two) Times a Day. 180 tablet 1   • [DISCONTINUED] ezetimibe (ZETIA) 10 MG tablet Take 1 tablet by mouth Daily. 90 tablet 1   • [DISCONTINUED] lisinopril-hydrochlorothiazide (PRINZIDE,ZESTORETIC) 20-25 MG per tablet Take 1 tablet by mouth Daily. 90 tablet 1   • [DISCONTINUED] metoprolol succinate XL (TOPROL-XL) 100 MG 24 hr tablet Take 1 tablet by mouth Daily. 90 tablet 1   • [DISCONTINUED] nitroglycerin (NITROSTAT) 0.4 MG SL tablet Place 1 tablet under the tongue Every 5 (Five) Minutes As Needed for Chest Pain. Take no more than 3 doses in 15 minutes. 25 tablet 3   • [DISCONTINUED] omeprazole (priLOSEC) 20 MG capsule Take 1 capsule by mouth Daily. 90 capsule 1   • [DISCONTINUED] rosuvastatin (CRESTOR) 40 MG tablet Take 1 tablet by mouth Daily. 90 tablet 1     No current facility-administered medications on file prior to visit.       ALLERGIES  Patient has no known allergies.    HISTORY  Past Medical History:   Diagnosis Date   • Acid reflux    • CAD (coronary artery disease)    • HTN (hypertension)    • Hyperlipidemia    • PVD (peripheral vascular disease) (HCC)    • Rheumatoid arthritis (HCC)    • Sleep apnea        Social History     Socioeconomic History   • Marital status:    Tobacco Use   • Smoking status: Former     Packs/day: 1.00     Years: 16.00     Pack years: 16.00     Types: Cigarettes     Quit date:      Years since quittin.9   • Smokeless tobacco: Never   Vaping Use   • Vaping Use: Never used   Substance and Sexual Activity   • Alcohol use: Yes   • Drug use: Never   • Sexual activity: Defer       Family History   Problem Relation Age of Onset   • Heart disease Mother    • Hypertension Father    • Hyperlipidemia Father        Review of Systems   Constitutional: Negative.  Negative for fatigue.   HENT: Positive for  "hearing loss (Forgot hearing aids). Negative for congestion, rhinorrhea and sore throat.    Eyes: Negative.  Negative for visual disturbance.   Respiratory: Negative.  Negative for chest tightness and shortness of breath.    Cardiovascular: Negative.  Negative for chest pain, palpitations and leg swelling.   Gastrointestinal: Negative.  Negative for abdominal pain, blood in stool, constipation, diarrhea, nausea and vomiting.   Endocrine: Negative.  Negative for cold intolerance and heat intolerance.   Genitourinary: Negative.  Negative for difficulty urinating, dysuria, frequency, hematuria and urgency.   Musculoskeletal: Positive for arthralgias. Negative for back pain and neck pain.   Skin: Negative.  Negative for rash and wound.   Allergic/Immunologic: Negative.  Negative for environmental allergies and food allergies.   Neurological: Negative.  Negative for dizziness, syncope, weakness, light-headedness, numbness and headaches.   Hematological: Negative.  Does not bruise/bleed easily.   Psychiatric/Behavioral: Negative.  Negative for sleep disturbance.       Objective     VITALS: /78   Pulse 75   Ht 175.3 cm (69\")   Wt 78 kg (172 lb)   SpO2 96%   BMI 25.40 kg/m²     LABS:   Lab Results (most recent)     None          IMAGING:   No Images in the past 120 days found..    EXAM:  Physical Exam  Constitutional:       Appearance: He is well-developed.   HENT:      Head: Normocephalic and atraumatic.   Eyes:      Pupils: Pupils are equal, round, and reactive to light.   Neck:      Thyroid: No thyromegaly.      Vascular: No JVD.   Cardiovascular:      Rate and Rhythm: Normal rate and regular rhythm.      Chest Wall: PMI is not displaced.      Pulses: Normal pulses.      Heart sounds: Normal heart sounds, S1 normal and S2 normal. No murmur heard.    No friction rub. No gallop.      Comments: Healed sternotomy scar  Pulmonary:      Effort: Pulmonary effort is normal. No respiratory distress.      Breath sounds: " Normal breath sounds. No stridor. No wheezing or rales.   Chest:      Chest wall: No tenderness.   Abdominal:      General: Bowel sounds are normal. There is no distension.      Palpations: Abdomen is soft. There is no mass.      Tenderness: There is no abdominal tenderness. There is no guarding or rebound.   Musculoskeletal:      Cervical back: Neck supple. No edema.   Skin:     General: Skin is warm and dry.      Coloration: Skin is not pale.      Findings: No erythema or rash.   Neurological:      Mental Status: He is alert and oriented to person, place, and time.      Cranial Nerves: No cranial nerve deficit.      Coordination: Coordination normal.   Psychiatric:         Behavior: Behavior normal.         Procedure   Procedures       Assessment & Plan     Diagnoses and all orders for this visit:    1. Multiple premature ventricular complexes (Primary)    2. Asymptomatic PVD (peripheral vascular disease) (HCC)  -     cilostazol (PLETAL) 100 MG tablet; Take 1 tablet by mouth 2 (Two) Times a Day.  Dispense: 180 tablet; Refill: 1    3. Mixed hyperlipidemia  -     ezetimibe (ZETIA) 10 MG tablet; Take 1 tablet by mouth Daily.  Dispense: 90 tablet; Refill: 1    4. Primary hypertension    5. Atherosclerosis of native coronary artery of native heart without angina pectoris  -     nitroglycerin (NITROSTAT) 0.4 MG SL tablet; Place 1 tablet under the tongue Every 5 (Five) Minutes As Needed for Chest Pain. Take no more than 3 doses in 15 minutes.  Dispense: 25 tablet; Refill: 3  -     omeprazole (priLOSEC) 20 MG capsule; Take 1 capsule by mouth Daily.  Dispense: 90 capsule; Refill: 1    6. S/P CABG (coronary artery bypass graft)    7. MUSTAPHA on CPAP    8. Cigarette nicotine dependence in remission    9. Hypertension, unspecified type  -     lisinopril-hydrochlorothiazide (PRINZIDE,ZESTORETIC) 20-25 MG per tablet; Take 1 tablet by mouth Daily.  Dispense: 90 tablet; Refill: 1  -     metoprolol succinate XL (TOPROL-XL) 100 MG 24  hr tablet; Take 1 tablet by mouth Daily.  Dispense: 90 tablet; Refill: 1    10. Precordial pain  -     nitroglycerin (NITROSTAT) 0.4 MG SL tablet; Place 1 tablet under the tongue Every 5 (Five) Minutes As Needed for Chest Pain. Take no more than 3 doses in 15 minutes.  Dispense: 25 tablet; Refill: 3    11. Hyperlipidemia, unspecified hyperlipidemia type  -     rosuvastatin (CRESTOR) 40 MG tablet; Take 1 tablet by mouth Daily.  Dispense: 90 tablet; Refill: 1  -     Lipid Panel; Future  -     Comprehensive Metabolic Panel; Future    1.  He has been no premature complexes today I told him he is denying any symptoms whatsoever unfortunately he did not have the event monitor done but his echocardiogram showed normal systolic function and diastolic dysfunction and as he is completely asymptomatic right now we will just monitor him  2.  His blood pressure is a little bit elevated today with a systolic pressure of 142 it was well controlled before we will monitor for now, advised him regarding his blood pressure to keep an eye on his blood pressure and call me if his blood pressure more measurements at home is high  3.  Cardiac wise he has no chest pain or told we will continue current medications requiring daily aspirin  4.  I reviewed the labs with unremarkable CMP and excellent lipid profile with HDL of 42 and LDL of 57 we will continue current management  5.  He is using the CPAP every night we will continue current management  Regarding peripheral vascular disease he is denying any claudications  Return in about 6 months (around 6/7/2023).      Advance Care Planning   ACP discussion was declined by the patient. Patient does not have an advance directive, declines further assistance.             MEDS ORDERED DURING VISIT:  New Medications Ordered This Visit   Medications   • cilostazol (PLETAL) 100 MG tablet     Sig: Take 1 tablet by mouth 2 (Two) Times a Day.     Dispense:  180 tablet     Refill:  1   • ezetimibe  (ZETIA) 10 MG tablet     Sig: Take 1 tablet by mouth Daily.     Dispense:  90 tablet     Refill:  1   • lisinopril-hydrochlorothiazide (PRINZIDE,ZESTORETIC) 20-25 MG per tablet     Sig: Take 1 tablet by mouth Daily.     Dispense:  90 tablet     Refill:  1   • metoprolol succinate XL (TOPROL-XL) 100 MG 24 hr tablet     Sig: Take 1 tablet by mouth Daily.     Dispense:  90 tablet     Refill:  1   • nitroglycerin (NITROSTAT) 0.4 MG SL tablet     Sig: Place 1 tablet under the tongue Every 5 (Five) Minutes As Needed for Chest Pain. Take no more than 3 doses in 15 minutes.     Dispense:  25 tablet     Refill:  3   • omeprazole (priLOSEC) 20 MG capsule     Sig: Take 1 capsule by mouth Daily.     Dispense:  90 capsule     Refill:  1   • rosuvastatin (CRESTOR) 40 MG tablet     Sig: Take 1 tablet by mouth Daily.     Dispense:  90 tablet     Refill:  1       As always, Lowell Tam APRN  I appreciate very much the opportunity to participate in the cardiovascular care of your patients. Please do not hesitate to call me with any questions with regards to Ryland Trujillo evaluation and management.         This document has been electronically signed by Patricia Shaffer MD  December 7, 2022 11:38 EST

## 2023-03-19 NOTE — TELEPHONE ENCOUNTER
Rec'd a faxed request from [facility]'s for a refill on NTG. Rx set up to be filled and sent to Dr. Shaffer  for final review.  [place] DAVID   Size Of Lesion In Cm (Required): 0.7 No

## 2023-06-07 ENCOUNTER — OFFICE VISIT (OUTPATIENT)
Dept: CARDIOLOGY | Facility: CLINIC | Age: 73
End: 2023-06-07
Payer: MEDICARE

## 2023-06-07 VITALS
BODY MASS INDEX: 25.81 KG/M2 | SYSTOLIC BLOOD PRESSURE: 140 MMHG | OXYGEN SATURATION: 98 % | HEART RATE: 98 BPM | DIASTOLIC BLOOD PRESSURE: 80 MMHG | WEIGHT: 174.8 LBS

## 2023-06-07 DIAGNOSIS — G47.33 OSA ON CPAP: ICD-10-CM

## 2023-06-07 DIAGNOSIS — I73.9 ASYMPTOMATIC PVD (PERIPHERAL VASCULAR DISEASE): ICD-10-CM

## 2023-06-07 DIAGNOSIS — E78.5 HYPERLIPIDEMIA, UNSPECIFIED HYPERLIPIDEMIA TYPE: ICD-10-CM

## 2023-06-07 DIAGNOSIS — Z95.1 S/P CABG (CORONARY ARTERY BYPASS GRAFT): ICD-10-CM

## 2023-06-07 DIAGNOSIS — I10 PRIMARY HYPERTENSION: Primary | ICD-10-CM

## 2023-06-07 DIAGNOSIS — I49.3 PREMATURE VENTRICULAR COMPLEX: ICD-10-CM

## 2023-06-07 DIAGNOSIS — I10 HYPERTENSION, UNSPECIFIED TYPE: ICD-10-CM

## 2023-06-07 DIAGNOSIS — R07.2 PRECORDIAL PAIN: ICD-10-CM

## 2023-06-07 DIAGNOSIS — F17.211 CIGARETTE NICOTINE DEPENDENCE IN REMISSION: ICD-10-CM

## 2023-06-07 DIAGNOSIS — E78.2 MIXED HYPERLIPIDEMIA: ICD-10-CM

## 2023-06-07 DIAGNOSIS — I25.10 ATHEROSCLEROSIS OF NATIVE CORONARY ARTERY OF NATIVE HEART WITHOUT ANGINA PECTORIS: ICD-10-CM

## 2023-06-07 DIAGNOSIS — Z99.89 OSA ON CPAP: ICD-10-CM

## 2023-06-07 RX ORDER — NITROGLYCERIN 0.4 MG/1
0.4 TABLET SUBLINGUAL
Qty: 25 TABLET | Refills: 3 | Status: SHIPPED | OUTPATIENT
Start: 2023-06-07

## 2023-06-07 RX ORDER — AMLODIPINE BESYLATE 5 MG/1
5 TABLET ORAL DAILY
Qty: 90 TABLET | Refills: 1 | Status: SHIPPED | OUTPATIENT
Start: 2023-06-07

## 2023-06-07 RX ORDER — METOPROLOL SUCCINATE 100 MG/1
100 TABLET, EXTENDED RELEASE ORAL DAILY
Qty: 90 TABLET | Refills: 1 | Status: SHIPPED | OUTPATIENT
Start: 2023-06-07

## 2023-06-07 RX ORDER — LISINOPRIL AND HYDROCHLOROTHIAZIDE 25; 20 MG/1; MG/1
1 TABLET ORAL DAILY
Qty: 90 TABLET | Refills: 1 | Status: SHIPPED | OUTPATIENT
Start: 2023-06-07

## 2023-06-07 RX ORDER — EZETIMIBE 10 MG/1
10 TABLET ORAL DAILY
Qty: 90 TABLET | Refills: 1 | Status: SHIPPED | OUTPATIENT
Start: 2023-06-07

## 2023-06-07 RX ORDER — ROSUVASTATIN CALCIUM 40 MG/1
40 TABLET, COATED ORAL DAILY
Qty: 90 TABLET | Refills: 1 | Status: SHIPPED | OUTPATIENT
Start: 2023-06-07

## 2023-06-07 RX ORDER — CILOSTAZOL 100 MG/1
100 TABLET ORAL 2 TIMES DAILY
Qty: 180 TABLET | Refills: 1 | Status: SHIPPED | OUTPATIENT
Start: 2023-06-07

## 2023-06-07 NOTE — PROGRESS NOTES
Subjective   Follow up, hypertension  Ryland Trujillo is a 72 y.o. male who presents to day for Follow-up (6 MTH F/U ).    CHIEF COMPLIANT  Chief Complaint   Patient presents with    Follow-up     6 MTH F/U        Active Problems:  Problem List Items Addressed This Visit          Cardiac and Vasculature    Hyperlipidemia    HTN (hypertension) - Primary    Atherosclerosis of native coronary artery of native heart without angina pectoris    S/P CABG (coronary artery bypass graft)    Asymptomatic PVD (peripheral vascular disease)    Premature ventricular complex       Sleep    MUSTAPHA on CPAP       Tobacco    Cigarette nicotine dependence in remission       Hyperlipidemia     Relevant Medications     ezetimibe (ZETIA) 10 MG tablet     rosuvastatin (CRESTOR) 40 MG tablet     Other Relevant Orders     Lipid Panel     Comprehensive Metabolic Panel     HTN (hypertension)     Relevant Medications     lisinopril-hydrochlorothiazide (PRINZIDE,ZESTORETIC) 20-25 MG per tablet     metoprolol succinate XL (TOPROL-XL) 100 MG 24 hr tablet     Atherosclerosis of native coronary artery of native heart without angina pectoris     Relevant Medications     cilostazol (PLETAL) 100 MG tablet     metoprolol succinate XL (TOPROL-XL) 100 MG 24 hr tablet     nitroglycerin (NITROSTAT) 0.4 MG SL tablet     omeprazole (priLOSEC) 20 MG capsule     S/P CABG (coronary artery bypass graft)     Asymptomatic PVD (peripheral vascular disease) (HCC)     Relevant Medications     cilostazol (PLETAL) 100 MG tablet     Multiple premature ventricular complexes - Primary     Relevant Medications     cilostazol (PLETAL) 100 MG tablet     metoprolol succinate XL (TOPROL-XL) 100 MG 24 hr tablet     nitroglycerin (NITROSTAT) 0.4 MG SL tablet       HPI  HPI  He has been doing well he has rare palpitations no dizziness no shortness of breath no chest pain no edema  PRIOR MEDS  Current Outpatient Medications on File Prior to Visit   Medication Sig Dispense Refill    aspirin  81 MG EC tablet Take 1 tablet by mouth Daily. 90 tablet 1    cilostazol (PLETAL) 100 MG tablet Take 1 tablet by mouth 2 (Two) Times a Day. 180 tablet 1    coenzyme Q10 100 MG capsule Take 2 capsules by mouth Daily. 90 capsule 1    ezetimibe (ZETIA) 10 MG tablet Take 1 tablet by mouth Daily. 90 tablet 1    ibuprofen (ADVIL,MOTRIN) 200 MG tablet Take 1 tablet by mouth Every 6 (Six) Hours As Needed for Mild Pain.      lisinopril-hydrochlorothiazide (PRINZIDE,ZESTORETIC) 20-25 MG per tablet Take 1 tablet by mouth Daily. 90 tablet 1    metoprolol succinate XL (TOPROL-XL) 100 MG 24 hr tablet Take 1 tablet by mouth Daily. 90 tablet 1    nitroglycerin (NITROSTAT) 0.4 MG SL tablet Place 1 tablet under the tongue Every 5 (Five) Minutes As Needed for Chest Pain. Take no more than 3 doses in 15 minutes. 25 tablet 3    omeprazole (priLOSEC) 20 MG capsule Take 1 capsule by mouth Daily. 90 capsule 1    rosuvastatin (CRESTOR) 40 MG tablet Take 1 tablet by mouth Daily. 90 tablet 1     No current facility-administered medications on file prior to visit.       ALLERGIES  Patient has no known allergies.    HISTORY  Past Medical History:   Diagnosis Date    Acid reflux     CAD (coronary artery disease)     HTN (hypertension)     Hyperlipidemia     PVD (peripheral vascular disease)     Rheumatoid arthritis     Sleep apnea        Social History     Socioeconomic History    Marital status:    Tobacco Use    Smoking status: Former     Packs/day: 1.00     Years: 16.00     Pack years: 16.00     Types: Cigarettes     Quit date:      Years since quittin.4    Smokeless tobacco: Never   Vaping Use    Vaping Use: Never used   Substance and Sexual Activity    Alcohol use: Yes    Drug use: Never    Sexual activity: Defer       Family History   Problem Relation Age of Onset    Heart disease Mother     Hypertension Father     Hyperlipidemia Father        Review of Systems   Constitutional:  Negative for appetite change, chills and  fever.   HENT:  Positive for ear pain. Negative for drooling, hearing loss, postnasal drip, sneezing and trouble swallowing.    Eyes:  Negative for pain, redness, itching and visual disturbance.   Respiratory:  Negative for cough, choking, shortness of breath, wheezing and stridor.    Cardiovascular:  Positive for palpitations. Negative for chest pain and leg swelling.   Gastrointestinal:  Negative for blood in stool and constipation.   Genitourinary: Negative.  Negative for difficulty urinating.   Musculoskeletal:  Negative for back pain and neck pain.   Skin:  Negative for rash and wound.   Neurological:  Negative for dizziness, weakness and numbness.   Psychiatric/Behavioral:  Negative for sleep disturbance.      Objective     VITALS: /80 (BP Location: Left arm, Patient Position: Sitting, Cuff Size: Adult)   Pulse 98   Wt 79.3 kg (174 lb 12.8 oz)   SpO2 98%   BMI 25.81 kg/m²     LABS:   Lab Results (most recent)       None            IMAGING:   No Images in the past 120 days found..    EXAM:  Physical Exam  Constitutional:       Appearance: He is well-developed.   HENT:      Head: Normocephalic and atraumatic.   Eyes:      Pupils: Pupils are equal, round, and reactive to light.   Neck:      Thyroid: No thyromegaly.      Vascular: No JVD.   Cardiovascular:      Rate and Rhythm: Normal rate and regular rhythm.      Chest Wall: PMI is not displaced.      Pulses: Normal pulses.      Heart sounds: Normal heart sounds, S1 normal and S2 normal. No murmur heard.    No friction rub. No gallop.      Comments: Healed thoracotomy scar  Pulmonary:      Effort: Pulmonary effort is normal. No respiratory distress.      Breath sounds: Normal breath sounds. No stridor. No wheezing or rales.   Chest:      Chest wall: No tenderness.   Abdominal:      General: Bowel sounds are normal. There is no distension.      Palpations: Abdomen is soft. There is no mass.      Tenderness: There is no abdominal tenderness. There is no  guarding or rebound.   Musculoskeletal:      Cervical back: Neck supple. No edema.   Skin:     General: Skin is warm and dry.      Coloration: Skin is not pale.      Findings: No erythema or rash.   Neurological:      Mental Status: He is alert and oriented to person, place, and time.      Cranial Nerves: No cranial nerve deficit.      Coordination: Coordination normal.   Psychiatric:         Behavior: Behavior normal.       Procedure   Procedures       Assessment & Plan     Diagnoses and all orders for this visit:    1. Primary hypertension (Primary)    2. Mixed hyperlipidemia    3. Atherosclerosis of native coronary artery of native heart without angina pectoris    4. S/P CABG (coronary artery bypass graft)    5. Asymptomatic PVD (peripheral vascular disease)    6. Premature ventricular complex    7. MUSTAPHA on CPAP    8. Cigarette nicotine dependence in remission    1.  His blood pressure remains slightly elevated I am going to the Motifene 5 mg/day  2.  I reviewed the labs with elevated fasting glucose consistent with prediabetes also he has significant jump on the level of his LDL from only !  He said he has been compliant with his rosuvastatin I am going to restart the medicine again making sure that he is taking it and then I will repeat his labs for assessment   3.  No recent chest pain continue current management  4.  No significant palpitation continue the current dose of metoprolol  5.  He is using CPAP every night but he does not have any follow-up physician for the CPAP some going to refer him to pulmonology to follow-up his CPAP      No follow-ups on file.      Advance Care Planning   ACP discussion was held with the patient during this visit. Patient does not have an advance directive, declines further assistance.             MEDS ORDERED DURING VISIT:  No orders of the defined types were placed in this encounter.      As always, Lowell Tam APRN  I appreciate very much the opportunity to  participate in the cardiovascular care of your patients. Please do not hesitate to call me with any questions with regards to Ryland Ronnie evaluation and management.         This document has been electronically signed by Patricia Shaffer MD  June 7, 2023 11:53 EDT    This note is dictated utilizing voice recognition software.

## 2023-07-27 ENCOUNTER — OFFICE VISIT (OUTPATIENT)
Dept: PULMONOLOGY | Facility: CLINIC | Age: 73
End: 2023-07-27
Payer: MEDICARE

## 2023-07-27 VITALS
BODY MASS INDEX: 25.18 KG/M2 | DIASTOLIC BLOOD PRESSURE: 63 MMHG | SYSTOLIC BLOOD PRESSURE: 108 MMHG | HEIGHT: 69 IN | OXYGEN SATURATION: 97 % | HEART RATE: 67 BPM | WEIGHT: 170 LBS

## 2023-07-27 DIAGNOSIS — Z87.891 PERSONAL HISTORY OF NICOTINE DEPENDENCE: ICD-10-CM

## 2023-07-27 DIAGNOSIS — G47.33 OSA ON CPAP: Primary | ICD-10-CM

## 2023-07-27 DIAGNOSIS — Z99.89 OSA ON CPAP: Primary | ICD-10-CM

## 2023-07-27 DIAGNOSIS — R06.02 SHORTNESS OF BREATH: ICD-10-CM

## 2023-07-31 ENCOUNTER — PATIENT ROUNDING (BHMG ONLY) (OUTPATIENT)
Dept: PULMONOLOGY | Facility: CLINIC | Age: 73
End: 2023-07-31
Payer: MEDICARE

## 2023-09-07 ENCOUNTER — OFFICE VISIT (OUTPATIENT)
Dept: CARDIOLOGY | Facility: CLINIC | Age: 73
End: 2023-09-07
Payer: MEDICARE

## 2023-09-07 VITALS
DIASTOLIC BLOOD PRESSURE: 86 MMHG | HEART RATE: 78 BPM | SYSTOLIC BLOOD PRESSURE: 155 MMHG | BODY MASS INDEX: 25.39 KG/M2 | HEIGHT: 69 IN | OXYGEN SATURATION: 98 % | WEIGHT: 171.4 LBS

## 2023-09-07 DIAGNOSIS — I10 HYPERTENSION, UNSPECIFIED TYPE: ICD-10-CM

## 2023-09-07 DIAGNOSIS — I25.10 ATHEROSCLEROSIS OF NATIVE CORONARY ARTERY OF NATIVE HEART WITHOUT ANGINA PECTORIS: ICD-10-CM

## 2023-09-07 DIAGNOSIS — I10 PRIMARY HYPERTENSION: ICD-10-CM

## 2023-09-07 DIAGNOSIS — E78.5 HYPERLIPIDEMIA, UNSPECIFIED HYPERLIPIDEMIA TYPE: ICD-10-CM

## 2023-09-07 DIAGNOSIS — I73.9 ASYMPTOMATIC PVD (PERIPHERAL VASCULAR DISEASE): ICD-10-CM

## 2023-09-07 DIAGNOSIS — E78.2 MIXED HYPERLIPIDEMIA: ICD-10-CM

## 2023-09-07 RX ORDER — ASPIRIN 81 MG/1
81 TABLET ORAL DAILY
Qty: 90 TABLET | Refills: 1 | Status: SHIPPED | OUTPATIENT
Start: 2023-09-07

## 2023-09-07 RX ORDER — UBIDECARENONE 100 MG
200 CAPSULE ORAL DAILY
Qty: 180 CAPSULE | Refills: 3 | Status: SHIPPED | OUTPATIENT
Start: 2023-09-07

## 2023-09-07 RX ORDER — CILOSTAZOL 100 MG/1
100 TABLET ORAL 2 TIMES DAILY
Qty: 180 TABLET | Refills: 3 | Status: SHIPPED | OUTPATIENT
Start: 2023-09-07

## 2023-09-07 RX ORDER — METOPROLOL SUCCINATE 100 MG/1
100 TABLET, EXTENDED RELEASE ORAL DAILY
Qty: 90 TABLET | Refills: 3 | Status: SHIPPED | OUTPATIENT
Start: 2023-09-07

## 2023-09-07 RX ORDER — EZETIMIBE 10 MG/1
10 TABLET ORAL DAILY
Qty: 90 TABLET | Refills: 3 | Status: SHIPPED | OUTPATIENT
Start: 2023-09-07

## 2023-09-07 RX ORDER — AMLODIPINE BESYLATE 10 MG/1
10 TABLET ORAL DAILY
Qty: 90 TABLET | Refills: 3 | Status: SHIPPED | OUTPATIENT
Start: 2023-09-07

## 2023-09-07 RX ORDER — ROSUVASTATIN CALCIUM 40 MG/1
40 TABLET, COATED ORAL DAILY
Qty: 90 TABLET | Refills: 3 | Status: SHIPPED | OUTPATIENT
Start: 2023-09-07

## 2023-09-07 RX ORDER — LISINOPRIL AND HYDROCHLOROTHIAZIDE 25; 20 MG/1; MG/1
1 TABLET ORAL DAILY
Qty: 90 TABLET | Refills: 3 | Status: SHIPPED | OUTPATIENT
Start: 2023-09-07

## 2023-09-07 NOTE — PROGRESS NOTES
Subjective     Ryland Trujillo is a 72 y.o. male who presents to day for 3 month follow up  and Hypertension.    CHIEF COMPLIANT  Chief Complaint   Patient presents with    3 month follow up     Hypertension       Active Problems:  Problem List Items Addressed This Visit          Cardiac and Vasculature    Hyperlipidemia    Relevant Medications    ezetimibe (ZETIA) 10 MG tablet    rosuvastatin (CRESTOR) 40 MG tablet    coenzyme Q10 100 MG capsule    HTN (hypertension)    Relevant Medications    amLODIPine (NORVASC) 10 MG tablet    lisinopril-hydrochlorothiazide (PRINZIDE,ZESTORETIC) 20-25 MG per tablet    metoprolol succinate XL (TOPROL-XL) 100 MG 24 hr tablet    Atherosclerosis of native coronary artery of native heart without angina pectoris    Relevant Medications    amLODIPine (NORVASC) 10 MG tablet    cilostazol (PLETAL) 100 MG tablet    metoprolol succinate XL (TOPROL-XL) 100 MG 24 hr tablet    aspirin 81 MG EC tablet    Asymptomatic PVD (peripheral vascular disease)    Relevant Medications    cilostazol (PLETAL) 100 MG tablet   Problem List:  1.Hyperlipidemia  2.HTN  3.Atherosclerosis of native Coronary artery of native heart without angina pectoris  4.CABG   5.Asymptomatic PVD  6.Multiple premature Ventricular Complexes    HPI  HPI  Mr. Ryland Trujillo is a 72-year-old male patient being followed up today for coronary artery disease and chronic arterial hypertension.  Patient does have coronary artery disease in which she went under coronary artery bypass grafting in the remote past.  He denies any angina anginal equivalent symptoms at this time including chest pain or shortness of breath.  He does report a adequate functional capacity without symptoms.  He reports that he commonly works in the garden and does a lot of walking and takes care of his own yard without any symptoms.  No limitations in his activities of daily living.  He is on high intensity statin therapy of a atorvastatin and antiplatelet therapy of  aspirin.    Patient does have chronic arterial hypertension in which she is on amlodipine, lisinopril-hydrochlorothiazide, and metoprolol.  Today's blood pressure is elevated 155/86 heart rate is 78.  He reports that his blood pressures usually about the same at home.  He is asymptomatic and has no associated symptoms.      PRIOR MEDS  Current Outpatient Medications on File Prior to Visit   Medication Sig Dispense Refill    ibuprofen (ADVIL,MOTRIN) 200 MG tablet Take 1 tablet by mouth Every 6 (Six) Hours As Needed for Mild Pain.      nitroglycerin (NITROSTAT) 0.4 MG SL tablet Place 1 tablet under the tongue Every 5 (Five) Minutes As Needed for Chest Pain. Take no more than 3 doses in 15 minutes. 25 tablet 3    omeprazole (priLOSEC) 20 MG capsule Take 1 capsule by mouth Daily. 90 capsule 1    [DISCONTINUED] amLODIPine (NORVASC) 5 MG tablet Take 1 tablet by mouth Daily. 90 tablet 1    [DISCONTINUED] aspirin 81 MG EC tablet Take 1 tablet by mouth Daily. 90 tablet 1    [DISCONTINUED] cilostazol (PLETAL) 100 MG tablet Take 1 tablet by mouth 2 (Two) Times a Day. 180 tablet 1    [DISCONTINUED] coenzyme Q10 100 MG capsule Take 2 capsules by mouth Daily. 90 capsule 1    [DISCONTINUED] ezetimibe (ZETIA) 10 MG tablet Take 1 tablet by mouth Daily. 90 tablet 1    [DISCONTINUED] lisinopril-hydrochlorothiazide (PRINZIDE,ZESTORETIC) 20-25 MG per tablet Take 1 tablet by mouth Daily. 90 tablet 1    [DISCONTINUED] metoprolol succinate XL (TOPROL-XL) 100 MG 24 hr tablet Take 1 tablet by mouth Daily. 90 tablet 1    [DISCONTINUED] rosuvastatin (CRESTOR) 40 MG tablet Take 1 tablet by mouth Daily. 90 tablet 1     No current facility-administered medications on file prior to visit.       ALLERGIES  Patient has no known allergies.    HISTORY  Past Medical History:   Diagnosis Date    Acid reflux     CAD (coronary artery disease)     HTN (hypertension)     Hyperlipidemia     PVD (peripheral vascular disease)     Rheumatoid arthritis     Sleep  "apnea        Social History     Socioeconomic History    Marital status:    Tobacco Use    Smoking status: Former     Packs/day: 1.00     Years: 16.00     Pack years: 16.00     Types: Cigarettes     Quit date:      Years since quittin.6     Passive exposure: Past    Smokeless tobacco: Never   Vaping Use    Vaping Use: Never used   Substance and Sexual Activity    Alcohol use: Yes     Comment: occasional    Drug use: Never    Sexual activity: Defer       Family History   Problem Relation Age of Onset    Heart disease Mother     Hypertension Father     Hyperlipidemia Father        Review of Systems   Constitutional:  Negative for chills, fatigue and fever.   HENT:  Negative for congestion, rhinorrhea and sore throat.    Eyes:  Negative for visual disturbance.   Respiratory:  Negative for apnea, chest tightness and shortness of breath.    Cardiovascular:  Negative for chest pain, palpitations and leg swelling.   Gastrointestinal:  Negative for constipation, diarrhea and nausea.   Musculoskeletal:  Positive for arthralgias. Negative for back pain and neck pain.   Allergic/Immunologic: Negative for environmental allergies and food allergies.   Neurological:  Negative for dizziness, syncope, weakness and light-headedness.   Hematological:  Does not bruise/bleed easily (bleeds easy).   Psychiatric/Behavioral:  Negative for sleep disturbance.      Objective     VITALS: /86 (BP Location: Right arm, Patient Position: Sitting, Cuff Size: Adult)   Pulse 78   Ht 175.3 cm (69.02\")   Wt 77.7 kg (171 lb 6.4 oz)   SpO2 98%   BMI 25.30 kg/m²     LABS:   Lab Results (most recent)       None            IMAGING:   No Images in the past 120 days found..    EXAM:  Physical Exam  Vitals and nursing note reviewed.   Constitutional:       Appearance: He is well-developed.   HENT:      Head: Normocephalic.      Nose: Nose normal.   Neck:      Thyroid: No thyroid mass.      Vascular: No carotid bruit or JVD.      " Trachea: Trachea and phonation normal.   Cardiovascular:      Rate and Rhythm: Normal rate and regular rhythm.      Pulses:           Radial pulses are 2+ on the right side and 2+ on the left side.        Posterior tibial pulses are 2+ on the right side and 2+ on the left side.      Heart sounds: Normal heart sounds. No murmur heard.    No friction rub. No gallop.   Pulmonary:      Effort: Pulmonary effort is normal. No respiratory distress.      Breath sounds: Normal breath sounds. No wheezing or rales.   Musculoskeletal:         General: No swelling. Normal range of motion.      Cervical back: Normal range of motion and neck supple.   Skin:     General: Skin is warm and dry.      Capillary Refill: Capillary refill takes less than 2 seconds.      Findings: No rash.   Neurological:      General: No focal deficit present.      Mental Status: He is alert and oriented to person, place, and time.   Psychiatric:         Mood and Affect: Mood normal.         Speech: Speech normal.         Behavior: Behavior normal.         Thought Content: Thought content normal.         Judgment: Judgment normal.       Procedure   Procedures       Assessment & Plan    Diagnosis Plan   1. Primary hypertension  amLODIPine (NORVASC) 10 MG tablet      2. Asymptomatic PVD (peripheral vascular disease)  cilostazol (PLETAL) 100 MG tablet      3. Mixed hyperlipidemia  ezetimibe (ZETIA) 10 MG tablet      4. Hypertension, unspecified type  lisinopril-hydrochlorothiazide (PRINZIDE,ZESTORETIC) 20-25 MG per tablet    metoprolol succinate XL (TOPROL-XL) 100 MG 24 hr tablet      5. Hyperlipidemia, unspecified hyperlipidemia type  rosuvastatin (CRESTOR) 40 MG tablet    coenzyme Q10 100 MG capsule      6. Atherosclerosis of native coronary artery of native heart without angina pectoris  aspirin 81 MG EC tablet      1.  Patient's blood pressure is elevated today at 155/86 heart rate is 78.  We will increase his amlodipine to 10 mg daily see if we can  better control his blood pressure.  He will keep a log for the next 2 weeks and return for further antihypertensive medication titration.  He will also continue the lisinopril hydrochlorothiazide and metoprolol.  Goal blood pressure is established at 130s over 80s.  2.  Patient does have stable coronary artery disease and denies any angina anginal equivalent symptoms.  He will continue high intensity statin therapy and antiplatelet therapy of aspirin.  We will continue to monitor.  3.  Patient's medications were refilled and sent to pharmacy of his choice.  No medication changes other than mentioned above.  4.  Informed of signs and symptoms of ACS and advised to seek emergent treatment for any new worsening symptoms.  Patient also advised sooner follow-up as needed.  Also advised to follow-up with family doctor as needed  This note is dictated utilizing voice recognition software.  Although this record has been proof read, transcriptional errors may still be present. If questions occur regarding the content of this record please do not hesitate to call our office.  I have reviewed and confirmed the accuracy of the ROS as documented by the MA/LPN/RN HILDA Webster    Return in about 6 months (around 3/7/2024), or if symptoms worsen or fail to improve.    Diagnoses and all orders for this visit:    1. Primary hypertension  -     amLODIPine (NORVASC) 10 MG tablet; Take 1 tablet by mouth Daily.  Dispense: 90 tablet; Refill: 3    2. Asymptomatic PVD (peripheral vascular disease)  -     cilostazol (PLETAL) 100 MG tablet; Take 1 tablet by mouth 2 (Two) Times a Day.  Dispense: 180 tablet; Refill: 3    3. Mixed hyperlipidemia  -     ezetimibe (ZETIA) 10 MG tablet; Take 1 tablet by mouth Daily.  Dispense: 90 tablet; Refill: 3    4. Hypertension, unspecified type  -     lisinopril-hydrochlorothiazide (PRINZIDE,ZESTORETIC) 20-25 MG per tablet; Take 1 tablet by mouth Daily.  Dispense: 90 tablet; Refill: 3  -      metoprolol succinate XL (TOPROL-XL) 100 MG 24 hr tablet; Take 1 tablet by mouth Daily.  Dispense: 90 tablet; Refill: 3    5. Hyperlipidemia, unspecified hyperlipidemia type  -     rosuvastatin (CRESTOR) 40 MG tablet; Take 1 tablet by mouth Daily.  Dispense: 90 tablet; Refill: 3  -     coenzyme Q10 100 MG capsule; Take 2 capsules by mouth Daily.  Dispense: 180 capsule; Refill: 3    6. Atherosclerosis of native coronary artery of native heart without angina pectoris  -     aspirin 81 MG EC tablet; Take 1 tablet by mouth Daily.  Dispense: 90 tablet; Refill: 1        Ryland Trujillo  reports that he quit smoking about 18 years ago. His smoking use included cigarettes. He has a 16.00 pack-year smoking history. He has been exposed to tobacco smoke. He has never used smokeless tobacco.. I have educated him on the risk of diseases from using tobacco products .    Advance Care Planning   ACP discussion was held with the patient during this visit. Patient does not have an advance directive, declines further assistance.           MEDS ORDERED DURING VISIT:  New Medications Ordered This Visit   Medications    amLODIPine (NORVASC) 10 MG tablet     Sig: Take 1 tablet by mouth Daily.     Dispense:  90 tablet     Refill:  3    cilostazol (PLETAL) 100 MG tablet     Sig: Take 1 tablet by mouth 2 (Two) Times a Day.     Dispense:  180 tablet     Refill:  3    ezetimibe (ZETIA) 10 MG tablet     Sig: Take 1 tablet by mouth Daily.     Dispense:  90 tablet     Refill:  3    lisinopril-hydrochlorothiazide (PRINZIDE,ZESTORETIC) 20-25 MG per tablet     Sig: Take 1 tablet by mouth Daily.     Dispense:  90 tablet     Refill:  3    rosuvastatin (CRESTOR) 40 MG tablet     Sig: Take 1 tablet by mouth Daily.     Dispense:  90 tablet     Refill:  3    metoprolol succinate XL (TOPROL-XL) 100 MG 24 hr tablet     Sig: Take 1 tablet by mouth Daily.     Dispense:  90 tablet     Refill:  3    aspirin 81 MG EC tablet     Sig: Take 1 tablet by mouth Daily.      Dispense:  90 tablet     Refill:  1    coenzyme Q10 100 MG capsule     Sig: Take 2 capsules by mouth Daily.     Dispense:  180 capsule     Refill:  3           This document has been electronically signed by Gio Merino Jr., APRN  September 7, 2023 11:20 EDT

## 2023-09-11 DIAGNOSIS — E78.2 MIXED HYPERLIPIDEMIA: ICD-10-CM

## 2023-09-28 DIAGNOSIS — R06.02 SHORTNESS OF BREATH: ICD-10-CM

## 2023-10-26 ENCOUNTER — OFFICE VISIT (OUTPATIENT)
Dept: PULMONOLOGY | Facility: CLINIC | Age: 73
End: 2023-10-26
Payer: MEDICARE

## 2023-10-26 VITALS
OXYGEN SATURATION: 96 % | HEIGHT: 69 IN | DIASTOLIC BLOOD PRESSURE: 66 MMHG | WEIGHT: 171 LBS | HEART RATE: 73 BPM | SYSTOLIC BLOOD PRESSURE: 104 MMHG | BODY MASS INDEX: 25.33 KG/M2

## 2023-10-26 DIAGNOSIS — R06.02 SHORTNESS OF BREATH: ICD-10-CM

## 2023-10-26 DIAGNOSIS — G47.33 OSA ON CPAP: Primary | ICD-10-CM

## 2023-10-26 PROCEDURE — 3074F SYST BP LT 130 MM HG: CPT | Performed by: NURSE PRACTITIONER

## 2023-10-26 PROCEDURE — 99213 OFFICE O/P EST LOW 20 MIN: CPT | Performed by: NURSE PRACTITIONER

## 2023-10-26 PROCEDURE — 3078F DIAST BP <80 MM HG: CPT | Performed by: NURSE PRACTITIONER

## 2023-10-26 NOTE — PROGRESS NOTES
"     Follow Up Office Visit      Patient Name: Ryland Trujillo    Chief Complaint:    Chief Complaint   Patient presents with    Sleeping Problem    Follow-up     PFT       History of Present Illness: Ryland Trujillo is a 73 y.o. male who is here today for follow up.  Since last visit, PFTs returned normal.  Patient reports that he did have his chest x-ray performed, though results are not currently available for me to review.  Patient reports that he is not having any breathing difficulty any longer and is able to tolerate his daily activities well.  He notes that when he was having dyspnea previously, this was around the time that he had had COVID, those symptoms now resolved.  Does not use any inhalers.  No cough, no wheezing, no fevers, no hemoptysis, no unintended weight loss.    Continues on CPAP, unsure of his DME though is a company located in [place].  He reports that he has \"good days and bad days\" with his CPAP, though is wearing it most nights.    Smoking history: 1 pack/day for 25-30 years, quit greater than 15 years ago    Subjective      Review of Systems:  Review of Systems   Constitutional:  Negative for fever and unexpected weight change.   Respiratory:  Negative for cough, shortness of breath and wheezing.    Cardiovascular:  Negative for chest pain and leg swelling.        Past Medical History:   Past Medical History:   Diagnosis Date    Acid reflux     CAD (coronary artery disease)     HTN (hypertension)     Hyperlipidemia     PVD (peripheral vascular disease)     Rheumatoid arthritis     Sleep apnea        Past Surgical History:   Past Surgical History:   Procedure Laterality Date    CORONARY ARTERY BYPASS GRAFT      THYROID SURGERY      Right thyroid removal        Family History:   Family History   Problem Relation Age of Onset    Heart disease Mother     Hypertension Father     Hyperlipidemia Father        Social History:   Social History     Socioeconomic History    Marital status:  " "  Tobacco Use    Smoking status: Former     Packs/day: 1.00     Years: 30.00     Additional pack years: 0.00     Total pack years: 30.00     Types: Cigarettes     Quit date:      Years since quittin.8     Passive exposure: Past    Smokeless tobacco: Never   Vaping Use    Vaping Use: Never used   Substance and Sexual Activity    Alcohol use: Yes     Comment: occasional    Drug use: Never    Sexual activity: Defer       Current Medications:     Current Outpatient Medications:     amLODIPine (NORVASC) 10 MG tablet, Take 1 tablet by mouth Daily., Disp: 90 tablet, Rfl: 3    aspirin 81 MG EC tablet, Take 1 tablet by mouth Daily., Disp: 90 tablet, Rfl: 1    cilostazol (PLETAL) 100 MG tablet, Take 1 tablet by mouth 2 (Two) Times a Day., Disp: 180 tablet, Rfl: 3    coenzyme Q10 100 MG capsule, Take 2 capsules by mouth Daily., Disp: 180 capsule, Rfl: 3    ezetimibe (ZETIA) 10 MG tablet, Take 1 tablet by mouth Daily., Disp: 90 tablet, Rfl: 3    ibuprofen (ADVIL,MOTRIN) 200 MG tablet, Take 1 tablet by mouth Every 6 (Six) Hours As Needed for Mild Pain., Disp: , Rfl:     metoprolol succinate XL (TOPROL-XL) 100 MG 24 hr tablet, Take 1 tablet by mouth Daily., Disp: 90 tablet, Rfl: 3    nitroglycerin (NITROSTAT) 0.4 MG SL tablet, Place 1 tablet under the tongue Every 5 (Five) Minutes As Needed for Chest Pain. Take no more than 3 doses in 15 minutes., Disp: 25 tablet, Rfl: 3    omeprazole (priLOSEC) 20 MG capsule, Take 1 capsule by mouth Daily., Disp: 90 capsule, Rfl: 1    rosuvastatin (CRESTOR) 40 MG tablet, Take 1 tablet by mouth Daily., Disp: 90 tablet, Rfl: 3    lisinopril-hydrochlorothiazide (PRINZIDE,ZESTORETIC) 20-25 MG per tablet, Take 1 tablet by mouth Daily., Disp: 90 tablet, Rfl: 3     Allergies:   No Known Allergies    Objective     Physical Exam:  Vital Signs:   Vitals:    10/26/23 0940   BP: 104/66   Pulse: 73   SpO2: 96%   Weight: 77.6 kg (171 lb)   Height: 175.3 cm (69\")     Body mass index is 25.25 " kg/m².    Physical Exam  Vitals reviewed.   Constitutional:       General: He is not in acute distress.     Appearance: He is not toxic-appearing.   HENT:      Head: Normocephalic and atraumatic.      Mouth/Throat:      Mouth: Mucous membranes are moist.   Eyes:      Conjunctiva/sclera: Conjunctivae normal.   Cardiovascular:      Rate and Rhythm: Normal rate.      Heart sounds: Normal heart sounds.   Pulmonary:      Effort: Pulmonary effort is normal.      Breath sounds: Normal breath sounds.   Abdominal:      General: There is no distension.      Palpations: Abdomen is soft.   Musculoskeletal:         General: No swelling.      Cervical back: Neck supple.   Skin:     General: Skin is warm and dry.      Findings: No rash.   Neurological:      General: No focal deficit present.      Mental Status: He is alert and oriented to person, place, and time.   Psychiatric:         Mood and Affect: Mood normal.         Behavior: Behavior normal.       Results Review:   September 2023 PFT showed normal spirometry, no significant bronchodilator response, normal lung volumes, normal DLCO.    Assessment / Plan      Assessment/Plan:   Diagnoses and all orders for this visit:    1. MUSTAPHA on CPAP (Primary)  Continue on CPAP.  Nightly compliance advised.  Will ask clinic staff to call the [employer] to try to determine who is supplying his equipment and to request compliance report and original sleep study report for review.    2. Shortness of breath  No longer apparent.  We will request chest x-ray report from [facility] and update patient on result once available.  Recent PFT results reviewed and discussed with patient, normal findings.  No further work-up indicated at this time though can reevaluate in the future if clinically warranted.       Follow Up:   Return in about 1 year (around 10/26/2024) for Recheck.  The patient was counseled on diagnostic results, risks and benefits of treatment options, risk  factor modifications and the importance of treatment compliance. The patient was advised to contact the clinic with concerns or worsening symptoms.     HILDA Dumont   Pulmonary [facility] [place]

## 2023-12-21 DIAGNOSIS — I25.10 ATHEROSCLEROSIS OF NATIVE CORONARY ARTERY OF NATIVE HEART WITHOUT ANGINA PECTORIS: ICD-10-CM

## 2023-12-21 RX ORDER — OMEPRAZOLE 20 MG/1
20 CAPSULE, DELAYED RELEASE ORAL DAILY
Qty: 90 CAPSULE | Refills: 1 | Status: SHIPPED | OUTPATIENT
Start: 2023-12-21

## 2024-03-07 ENCOUNTER — OFFICE VISIT (OUTPATIENT)
Dept: CARDIOLOGY | Facility: CLINIC | Age: 74
End: 2024-03-07
Payer: MEDICARE

## 2024-03-07 ENCOUNTER — LAB (OUTPATIENT)
Dept: LAB | Facility: HOSPITAL | Age: 74
End: 2024-03-07
Payer: MEDICARE

## 2024-03-07 VITALS
DIASTOLIC BLOOD PRESSURE: 76 MMHG | HEART RATE: 85 BPM | HEIGHT: 69 IN | OXYGEN SATURATION: 98 % | SYSTOLIC BLOOD PRESSURE: 120 MMHG | WEIGHT: 173.2 LBS | BODY MASS INDEX: 25.65 KG/M2

## 2024-03-07 DIAGNOSIS — I10 PRIMARY HYPERTENSION: Primary | ICD-10-CM

## 2024-03-07 DIAGNOSIS — I49.3 PREMATURE VENTRICULAR COMPLEX: ICD-10-CM

## 2024-03-07 DIAGNOSIS — E78.5 HYPERLIPIDEMIA, UNSPECIFIED HYPERLIPIDEMIA TYPE: ICD-10-CM

## 2024-03-07 DIAGNOSIS — I10 HYPERTENSION, UNSPECIFIED TYPE: ICD-10-CM

## 2024-03-07 DIAGNOSIS — E78.2 MIXED HYPERLIPIDEMIA: ICD-10-CM

## 2024-03-07 DIAGNOSIS — I10 PRIMARY HYPERTENSION: ICD-10-CM

## 2024-03-07 DIAGNOSIS — Z95.1 S/P CABG (CORONARY ARTERY BYPASS GRAFT): ICD-10-CM

## 2024-03-07 DIAGNOSIS — I25.10 ATHEROSCLEROSIS OF NATIVE CORONARY ARTERY OF NATIVE HEART WITHOUT ANGINA PECTORIS: ICD-10-CM

## 2024-03-07 DIAGNOSIS — R07.2 PRECORDIAL PAIN: ICD-10-CM

## 2024-03-07 DIAGNOSIS — I73.9 ASYMPTOMATIC PVD (PERIPHERAL VASCULAR DISEASE): ICD-10-CM

## 2024-03-07 LAB
ALBUMIN SERPL-MCNC: 4.8 G/DL (ref 3.5–5.2)
ALBUMIN/GLOB SERPL: 2 G/DL
ALP SERPL-CCNC: 86 U/L (ref 39–117)
ALT SERPL W P-5'-P-CCNC: 17 U/L (ref 1–41)
ANION GAP SERPL CALCULATED.3IONS-SCNC: 12.7 MMOL/L (ref 5–15)
AST SERPL-CCNC: 21 U/L (ref 1–40)
BASOPHILS # BLD AUTO: 0.04 10*3/MM3 (ref 0–0.2)
BASOPHILS NFR BLD AUTO: 0.4 % (ref 0–1.5)
BILIRUB SERPL-MCNC: 0.3 MG/DL (ref 0–1.2)
BUN SERPL-MCNC: 13 MG/DL (ref 8–23)
BUN/CREAT SERPL: 14 (ref 7–25)
CALCIUM SPEC-SCNC: 9.5 MG/DL (ref 8.6–10.5)
CHLORIDE SERPL-SCNC: 101 MMOL/L (ref 98–107)
CHOLEST SERPL-MCNC: 149 MG/DL (ref 0–200)
CO2 SERPL-SCNC: 25.3 MMOL/L (ref 22–29)
CREAT SERPL-MCNC: 0.93 MG/DL (ref 0.76–1.27)
DEPRECATED RDW RBC AUTO: 45.1 FL (ref 37–54)
EGFRCR SERPLBLD CKD-EPI 2021: 87.2 ML/MIN/1.73
EOSINOPHIL # BLD AUTO: 0.1 10*3/MM3 (ref 0–0.4)
EOSINOPHIL NFR BLD AUTO: 1.1 % (ref 0.3–6.2)
ERYTHROCYTE [DISTWIDTH] IN BLOOD BY AUTOMATED COUNT: 12.7 % (ref 12.3–15.4)
GLOBULIN UR ELPH-MCNC: 2.4 GM/DL
GLUCOSE SERPL-MCNC: 105 MG/DL (ref 65–99)
HCT VFR BLD AUTO: 40.4 % (ref 37.5–51)
HDLC SERPL-MCNC: 42 MG/DL (ref 40–60)
HGB BLD-MCNC: 13.5 G/DL (ref 13–17.7)
IMM GRANULOCYTES # BLD AUTO: 0.02 10*3/MM3 (ref 0–0.05)
IMM GRANULOCYTES NFR BLD AUTO: 0.2 % (ref 0–0.5)
LDLC SERPL CALC-MCNC: 87 MG/DL (ref 0–100)
LDLC/HDLC SERPL: 2.03 {RATIO}
LYMPHOCYTES # BLD AUTO: 1.64 10*3/MM3 (ref 0.7–3.1)
LYMPHOCYTES NFR BLD AUTO: 17.6 % (ref 19.6–45.3)
MAGNESIUM SERPL-MCNC: 2.3 MG/DL (ref 1.6–2.4)
MCH RBC QN AUTO: 32.5 PG (ref 26.6–33)
MCHC RBC AUTO-ENTMCNC: 33.4 G/DL (ref 31.5–35.7)
MCV RBC AUTO: 97.3 FL (ref 79–97)
MONOCYTES # BLD AUTO: 0.56 10*3/MM3 (ref 0.1–0.9)
MONOCYTES NFR BLD AUTO: 6 % (ref 5–12)
NEUTROPHILS NFR BLD AUTO: 6.97 10*3/MM3 (ref 1.7–7)
NEUTROPHILS NFR BLD AUTO: 74.7 % (ref 42.7–76)
NRBC BLD AUTO-RTO: 0 /100 WBC (ref 0–0.2)
PLATELET # BLD AUTO: 285 10*3/MM3 (ref 140–450)
PMV BLD AUTO: 9.5 FL (ref 6–12)
POTASSIUM SERPL-SCNC: 4.1 MMOL/L (ref 3.5–5.2)
PROT SERPL-MCNC: 7.2 G/DL (ref 6–8.5)
RBC # BLD AUTO: 4.15 10*6/MM3 (ref 4.14–5.8)
SODIUM SERPL-SCNC: 139 MMOL/L (ref 136–145)
TRIGL SERPL-MCNC: 108 MG/DL (ref 0–150)
TSH SERPL DL<=0.05 MIU/L-ACNC: 3.19 UIU/ML (ref 0.27–4.2)
VLDLC SERPL-MCNC: 20 MG/DL (ref 5–40)
WBC NRBC COR # BLD AUTO: 9.33 10*3/MM3 (ref 3.4–10.8)

## 2024-03-07 PROCEDURE — 36415 COLL VENOUS BLD VENIPUNCTURE: CPT

## 2024-03-07 PROCEDURE — 84443 ASSAY THYROID STIM HORMONE: CPT

## 2024-03-07 PROCEDURE — 85025 COMPLETE CBC W/AUTO DIFF WBC: CPT

## 2024-03-07 PROCEDURE — 83735 ASSAY OF MAGNESIUM: CPT

## 2024-03-07 PROCEDURE — 80053 COMPREHEN METABOLIC PANEL: CPT

## 2024-03-07 PROCEDURE — 80061 LIPID PANEL: CPT

## 2024-03-07 RX ORDER — EZETIMIBE 10 MG/1
10 TABLET ORAL DAILY
Qty: 90 TABLET | Refills: 3 | Status: SHIPPED | OUTPATIENT
Start: 2024-03-07

## 2024-03-07 RX ORDER — ASPIRIN 81 MG/1
81 TABLET ORAL DAILY
Qty: 90 TABLET | Refills: 1 | Status: SHIPPED | OUTPATIENT
Start: 2024-03-07

## 2024-03-07 RX ORDER — LISINOPRIL AND HYDROCHLOROTHIAZIDE 25; 20 MG/1; MG/1
1 TABLET ORAL DAILY
Qty: 90 TABLET | Refills: 3 | Status: SHIPPED | OUTPATIENT
Start: 2024-03-07

## 2024-03-07 RX ORDER — NITROGLYCERIN 0.4 MG/1
0.4 TABLET SUBLINGUAL
Qty: 25 TABLET | Refills: 3 | Status: SHIPPED | OUTPATIENT
Start: 2024-03-07

## 2024-03-07 RX ORDER — METOPROLOL SUCCINATE 100 MG/1
100 TABLET, EXTENDED RELEASE ORAL DAILY
Qty: 90 TABLET | Refills: 3 | Status: SHIPPED | OUTPATIENT
Start: 2024-03-07

## 2024-03-07 RX ORDER — ROSUVASTATIN CALCIUM 40 MG/1
40 TABLET, COATED ORAL DAILY
Qty: 90 TABLET | Refills: 3 | Status: SHIPPED | OUTPATIENT
Start: 2024-03-07

## 2024-03-07 RX ORDER — CILOSTAZOL 100 MG/1
100 TABLET ORAL 2 TIMES DAILY
Qty: 180 TABLET | Refills: 3 | Status: SHIPPED | OUTPATIENT
Start: 2024-03-07

## 2024-03-07 RX ORDER — UBIDECARENONE 100 MG
200 CAPSULE ORAL DAILY
Qty: 180 CAPSULE | Refills: 3 | Status: SHIPPED | OUTPATIENT
Start: 2024-03-07

## 2024-03-07 RX ORDER — AMLODIPINE BESYLATE 10 MG/1
10 TABLET ORAL DAILY
Qty: 90 TABLET | Refills: 3 | Status: SHIPPED | OUTPATIENT
Start: 2024-03-07

## 2024-03-07 NOTE — PROGRESS NOTES
Po Trujillo is a 73 y.o. male who presents to day for Follow-up (6mth).    CHIEF COMPLIANT  Chief Complaint   Patient presents with    Follow-up     6mth       Active Problems:  Problem List Items Addressed This Visit          Cardiac and Vasculature    Hyperlipidemia    Relevant Medications    rosuvastatin (CRESTOR) 40 MG tablet    ezetimibe (ZETIA) 10 MG tablet    coenzyme Q10 100 MG capsule    Other Relevant Orders    ECG 12 Lead    Comprehensive Metabolic Panel    CBC & Differential    Lipid Panel    TSH    Magnesium    HTN (hypertension) - Primary    Relevant Medications    metoprolol succinate XL (TOPROL-XL) 100 MG 24 hr tablet    lisinopril-hydrochlorothiazide (PRINZIDE,ZESTORETIC) 20-25 MG per tablet    amLODIPine (NORVASC) 10 MG tablet    Other Relevant Orders    ECG 12 Lead    Comprehensive Metabolic Panel    CBC & Differential    Lipid Panel    TSH    Magnesium    ECG 12 Lead    Comprehensive Metabolic Panel    CBC & Differential    Lipid Panel    TSH    Magnesium    Atherosclerosis of native coronary artery of native heart without angina pectoris    Relevant Medications    metoprolol succinate XL (TOPROL-XL) 100 MG 24 hr tablet    cilostazol (PLETAL) 100 MG tablet    amLODIPine (NORVASC) 10 MG tablet    aspirin 81 MG EC tablet    nitroglycerin (NITROSTAT) 0.4 MG SL tablet    S/P CABG (coronary artery bypass graft)    Relevant Orders    ECG 12 Lead    Comprehensive Metabolic Panel    CBC & Differential    Lipid Panel    TSH    Magnesium    Asymptomatic PVD (peripheral vascular disease)    Relevant Medications    cilostazol (PLETAL) 100 MG tablet    Premature ventricular complex    Relevant Medications    metoprolol succinate XL (TOPROL-XL) 100 MG 24 hr tablet    cilostazol (PLETAL) 100 MG tablet    amLODIPine (NORVASC) 10 MG tablet    nitroglycerin (NITROSTAT) 0.4 MG SL tablet    Other Relevant Orders    ECG 12 Lead    Comprehensive Metabolic Panel    CBC & Differential    Lipid Panel     TSH    Magnesium     Other Visit Diagnoses       Precordial pain        Relevant Medications    nitroglycerin (NITROSTAT) 0.4 MG SL tablet          Problem List:  1.Hyperlipidemia  1.1 echocardiogram 9/22: EF 56 to 60%, grade 1 diastolic dysfunction normal PA systolic pressures  2.HTN  3.Atherosclerosis of native Coronary artery of native heart without angina pectoris  4.CABG   5.Asymptomatic PVD  6.Multiple premature Ventricular Complexes  6.1 monitor 8/22: Predominantly normal sinus rhythm.  Rare PACs, frequent PACs with a burden of 9.78% with trigeminy bigeminy couplets and triplets.  13 runs of nonsustained ventricular tachycardia the longest being 3 beats and the fastest being 136 bpm     HPI  HPI  Ryland Trujillo is a 73-year-old male patient who is being followed up today for chronic arterial hypertension.  Patient does have a history of chronic arterial hypertension in which she is on metoprolol succinate, lisinopril-hydrochlorothiazide, and amlodipine.  Today's blood pressure is 120/76 heart rate of 85.  Says that his blood pressure has been running good.    Patient also has a history of atherosclerosis of his native coronary arteries without any current chest pain.  He is on high intensity statin therapy of rosuvastatin with Zetia in addition.    We did review his history of coronary artery bypass grafting in which he is on rosuvastatin and Zetia as well as antiplatelet therapy of aspirin.  He denies any angina anginal equivalent symptoms at this time.  He is doing well from the cardiovascular standpoint per his report.  He says he has no limitations and he pretty much does what he wants to.  He says the only thing is that he is pretty careful when he lifts heavy things.    He does have a history of obstructive sleep apnea's which she reports that he does use his CPAP.  Also has a history of frequent PVCs with an occurrence of 9.78% in which she is on metoprolol 100 mg daily.  He denies any palpitations at  this time.  We did discuss this in detail and he does report that he was very symptomatic previously and has been doing well lately.  No PVCs on his current EKG or during assessment.  PRIOR MEDS  Current Outpatient Medications on File Prior to Visit   Medication Sig Dispense Refill    ibuprofen (ADVIL,MOTRIN) 200 MG tablet Take 1 tablet by mouth Every 6 (Six) Hours As Needed for Mild Pain.      omeprazole (priLOSEC) 20 MG capsule TAKE 1 CAPSULE BY MOUTH ONCE DAILY 90 capsule 1    [DISCONTINUED] amLODIPine (NORVASC) 10 MG tablet Take 1 tablet by mouth Daily. 90 tablet 3    [DISCONTINUED] aspirin 81 MG EC tablet Take 1 tablet by mouth Daily. 90 tablet 1    [DISCONTINUED] cilostazol (PLETAL) 100 MG tablet Take 1 tablet by mouth 2 (Two) Times a Day. 180 tablet 3    [DISCONTINUED] coenzyme Q10 100 MG capsule Take 2 capsules by mouth Daily. 180 capsule 3    [DISCONTINUED] ezetimibe (ZETIA) 10 MG tablet Take 1 tablet by mouth Daily. 90 tablet 3    [DISCONTINUED] lisinopril-hydrochlorothiazide (PRINZIDE,ZESTORETIC) 20-25 MG per tablet Take 1 tablet by mouth Daily. 90 tablet 3    [DISCONTINUED] metoprolol succinate XL (TOPROL-XL) 100 MG 24 hr tablet Take 1 tablet by mouth Daily. 90 tablet 3    [DISCONTINUED] nitroglycerin (NITROSTAT) 0.4 MG SL tablet Place 1 tablet under the tongue Every 5 (Five) Minutes As Needed for Chest Pain. Take no more than 3 doses in 15 minutes. 25 tablet 3    [DISCONTINUED] rosuvastatin (CRESTOR) 40 MG tablet Take 1 tablet by mouth Daily. 90 tablet 3     No current facility-administered medications on file prior to visit.       ALLERGIES  Patient has no known allergies.    HISTORY  Past Medical History:   Diagnosis Date    Acid reflux     CAD (coronary artery disease)     HTN (hypertension)     Hyperlipidemia     PVD (peripheral vascular disease)     Rheumatoid arthritis     Sleep apnea        Social History     Socioeconomic History    Marital status:    Tobacco Use    Smoking status: Former  "    Current packs/day: 0.00     Average packs/day: 1 pack/day for 30.0 years (30.0 ttl pk-yrs)     Types: Cigarettes     Start date:      Quit date:      Years since quittin.1     Passive exposure: Past    Smokeless tobacco: Never   Vaping Use    Vaping status: Never Used   Substance and Sexual Activity    Alcohol use: Yes     Comment: occasional    Drug use: Never    Sexual activity: Defer       Family History   Problem Relation Age of Onset    Heart disease Mother     Hypertension Father     Hyperlipidemia Father        Review of Systems   Constitutional:  Negative for fatigue.   HENT:  Positive for rhinorrhea (allergies). Negative for congestion and sore throat.    Eyes:  Positive for visual disturbance (Glasses daily).   Respiratory:  Positive for apnea (CPAP). Negative for chest tightness and shortness of breath.    Cardiovascular:  Negative for chest pain, palpitations and leg swelling.   Gastrointestinal: Negative.    Genitourinary: Negative.    Allergic/Immunologic: Positive for environmental allergies.   Neurological:  Negative for dizziness, syncope, weakness, light-headedness, numbness and headaches.   Hematological:  Does not bruise/bleed easily.   Psychiatric/Behavioral:  Positive for sleep disturbance.        Objective     VITALS: /76   Pulse 85   Ht 175.3 cm (69\")   Wt 78.6 kg (173 lb 3.2 oz)   SpO2 98%   BMI 25.58 kg/m²     LABS:   Lab Results (most recent)       None            IMAGING:   No Images in the past 120 days found..    EXAM:  Physical Exam  Vitals and nursing note reviewed.   Constitutional:       Appearance: He is well-developed.   HENT:      Head: Normocephalic.   Neck:      Thyroid: No thyroid mass.      Vascular: No carotid bruit or JVD.      Trachea: Trachea and phonation normal.   Cardiovascular:      Rate and Rhythm: Normal rate and regular rhythm.      Pulses:           Radial pulses are 2+ on the right side and 2+ on the left side.        Posterior tibial " pulses are 2+ on the right side and 2+ on the left side.      Heart sounds: Murmur heard.      No friction rub. No gallop.   Pulmonary:      Effort: Pulmonary effort is normal. No respiratory distress.      Breath sounds: Normal breath sounds. No wheezing or rales.   Musculoskeletal:         General: No swelling. Normal range of motion.      Cervical back: Neck supple.   Skin:     General: Skin is warm and dry.      Capillary Refill: Capillary refill takes less than 2 seconds.      Findings: No rash.   Neurological:      Mental Status: He is alert and oriented to person, place, and time.   Psychiatric:         Speech: Speech normal.         Behavior: Behavior normal.         Thought Content: Thought content normal.         Judgment: Judgment normal.           Procedure     ECG 12 Lead    Date/Time: 3/7/2024 8:53 AM  Performed by: Gio Merino APRN    Authorized by: Gio Merino APRN  Comparison: compared with previous ECG from 8/17/2022  Comparison to previous ECG: Frequent PVCs previously and trigeminy  Rhythm: sinus rhythm  Rate: normal  BPM: 75  QRS axis: normal  Other findings: non-specific ST-T wave changes  Comments: QTc 405 ms  No acute changes             Assessment & Plan    Diagnosis Plan   1. Primary hypertension  ECG 12 Lead    Comprehensive Metabolic Panel    CBC & Differential    Lipid Panel    TSH    Magnesium    amLODIPine (NORVASC) 10 MG tablet      2. Hypertension, unspecified type  ECG 12 Lead    Comprehensive Metabolic Panel    CBC & Differential    Lipid Panel    TSH    Magnesium    metoprolol succinate XL (TOPROL-XL) 100 MG 24 hr tablet    lisinopril-hydrochlorothiazide (PRINZIDE,ZESTORETIC) 20-25 MG per tablet      3. Hyperlipidemia, unspecified hyperlipidemia type  ECG 12 Lead    Comprehensive Metabolic Panel    CBC & Differential    Lipid Panel    TSH    Magnesium    rosuvastatin (CRESTOR) 40 MG tablet    coenzyme Q10 100 MG capsule      4. S/P CABG (coronary artery bypass graft)   ECG 12 Lead    Comprehensive Metabolic Panel    CBC & Differential    Lipid Panel    TSH    Magnesium      5. Premature ventricular complex  ECG 12 Lead    Comprehensive Metabolic Panel    CBC & Differential    Lipid Panel    TSH    Magnesium      6. Mixed hyperlipidemia  ezetimibe (ZETIA) 10 MG tablet      7. Asymptomatic PVD (peripheral vascular disease)  cilostazol (PLETAL) 100 MG tablet      8. Atherosclerosis of native coronary artery of native heart without angina pectoris  aspirin 81 MG EC tablet    nitroglycerin (NITROSTAT) 0.4 MG SL tablet      9. Precordial pain  nitroglycerin (NITROSTAT) 0.4 MG SL tablet      1.  Patient's blood pressure is controlled on current blood pressure medication regimen.  No medication changes are warranted at this time.  Patient advised to monitor blood pressure on a daily basis and report any persistent highs or lows.  Set goal blood pressure for patient at 130/80 or below.  2.  Patient's PVCs seem to have decreased.  He is not symptomatic and denies any palpitations.  Is on metoprolol succinate 100 mg daily.  Will continue this without change.  If palpitations recur we will repeat monitor to make sure that his PVCs are controlled and if not we will consider antiarrhythmic therapy.  3.  Patient does have extensive history of coronary artery disease including coronary artery bypass grafting.  His previous lipid panel was reviewed in detail as well as his other labs that was provided by his PCP.  Due to unforeseen circumstances he is unable to have blood work done at his PCPs and it has been 6 months since latest labs and we will reorder CBC CMP TSH magnesium and lipid panel for further evaluation and treatment.  Previously his LDL was within goal.  4.  Patient does have a history of peripheral vascular disease in which she is asymptomatic.  Will continue to follow.  5.  Informed of signs and symptoms of ACS and advised to seek emergent treatment for any new worsening symptoms.   Patient also advised sooner follow-up as needed.  Also advised to follow-up with family doctor as needed  This note is dictated utilizing voice recognition software.  Although this record has been proof read, transcriptional errors may still be present. If questions occur regarding the content of this record please do not hesitate to call our office.  I have reviewed and confirmed the accuracy of the ROS as documented by the MA/LPN/RN HILDA Webster    Return if symptoms worsen or fail to improve, for Next scheduled follow up.    Diagnoses and all orders for this visit:    1. Primary hypertension (Primary)  -     ECG 12 Lead  -     Comprehensive Metabolic Panel; Future  -     CBC & Differential; Future  -     Lipid Panel; Future  -     TSH; Future  -     Magnesium; Future  -     amLODIPine (NORVASC) 10 MG tablet; Take 1 tablet by mouth Daily.  Dispense: 90 tablet; Refill: 3    2. Hypertension, unspecified type  -     ECG 12 Lead  -     Comprehensive Metabolic Panel; Future  -     CBC & Differential; Future  -     Lipid Panel; Future  -     TSH; Future  -     Magnesium; Future  -     metoprolol succinate XL (TOPROL-XL) 100 MG 24 hr tablet; Take 1 tablet by mouth Daily.  Dispense: 90 tablet; Refill: 3  -     lisinopril-hydrochlorothiazide (PRINZIDE,ZESTORETIC) 20-25 MG per tablet; Take 1 tablet by mouth Daily.  Dispense: 90 tablet; Refill: 3    3. Hyperlipidemia, unspecified hyperlipidemia type  -     ECG 12 Lead  -     Comprehensive Metabolic Panel; Future  -     CBC & Differential; Future  -     Lipid Panel; Future  -     TSH; Future  -     Magnesium; Future  -     rosuvastatin (CRESTOR) 40 MG tablet; Take 1 tablet by mouth Daily.  Dispense: 90 tablet; Refill: 3  -     coenzyme Q10 100 MG capsule; Take 2 capsules by mouth Daily.  Dispense: 180 capsule; Refill: 3    4. S/P CABG (coronary artery bypass graft)  -     ECG 12 Lead  -     Comprehensive Metabolic Panel; Future  -     CBC & Differential; Future  -      Lipid Panel; Future  -     TSH; Future  -     Magnesium; Future    5. Premature ventricular complex  -     ECG 12 Lead  -     Comprehensive Metabolic Panel; Future  -     CBC & Differential; Future  -     Lipid Panel; Future  -     TSH; Future  -     Magnesium; Future    6. Mixed hyperlipidemia  -     ezetimibe (ZETIA) 10 MG tablet; Take 1 tablet by mouth Daily.  Dispense: 90 tablet; Refill: 3    7. Asymptomatic PVD (peripheral vascular disease)  -     cilostazol (PLETAL) 100 MG tablet; Take 1 tablet by mouth 2 (Two) Times a Day.  Dispense: 180 tablet; Refill: 3    8. Atherosclerosis of native coronary artery of native heart without angina pectoris  -     aspirin 81 MG EC tablet; Take 1 tablet by mouth Daily.  Dispense: 90 tablet; Refill: 1  -     nitroglycerin (NITROSTAT) 0.4 MG SL tablet; Place 1 tablet under the tongue Every 5 (Five) Minutes As Needed for Chest Pain. Take no more than 3 doses in 15 minutes.  Dispense: 25 tablet; Refill: 3    9. Precordial pain  -     nitroglycerin (NITROSTAT) 0.4 MG SL tablet; Place 1 tablet under the tongue Every 5 (Five) Minutes As Needed for Chest Pain. Take no more than 3 doses in 15 minutes.  Dispense: 25 tablet; Refill: 3        Ryland Trujillo  reports that he quit smoking about 19 years ago. His smoking use included cigarettes. He started smoking about 49 years ago. He has a 30 pack-year smoking history. He has been exposed to tobacco smoke. He has never used smokeless tobacco.      BMI is >= 25 and <30. (Overweight) The following options were offered after discussion;: referral to primary care    Advance Care Planning   ACP discussion was declined by the patient. Patient does not have an advance directive, declines further assistance.      Patient brought in medicine list to appointment, it's been reviewed with patient and med list was updated in the chart.         MEDS ORDERED DURING VISIT:  New Medications Ordered This Visit   Medications    rosuvastatin (CRESTOR) 40 MG  tablet     Sig: Take 1 tablet by mouth Daily.     Dispense:  90 tablet     Refill:  3    metoprolol succinate XL (TOPROL-XL) 100 MG 24 hr tablet     Sig: Take 1 tablet by mouth Daily.     Dispense:  90 tablet     Refill:  3    lisinopril-hydrochlorothiazide (PRINZIDE,ZESTORETIC) 20-25 MG per tablet     Sig: Take 1 tablet by mouth Daily.     Dispense:  90 tablet     Refill:  3    ezetimibe (ZETIA) 10 MG tablet     Sig: Take 1 tablet by mouth Daily.     Dispense:  90 tablet     Refill:  3    cilostazol (PLETAL) 100 MG tablet     Sig: Take 1 tablet by mouth 2 (Two) Times a Day.     Dispense:  180 tablet     Refill:  3    amLODIPine (NORVASC) 10 MG tablet     Sig: Take 1 tablet by mouth Daily.     Dispense:  90 tablet     Refill:  3    coenzyme Q10 100 MG capsule     Sig: Take 2 capsules by mouth Daily.     Dispense:  180 capsule     Refill:  3    aspirin 81 MG EC tablet     Sig: Take 1 tablet by mouth Daily.     Dispense:  90 tablet     Refill:  1    nitroglycerin (NITROSTAT) 0.4 MG SL tablet     Sig: Place 1 tablet under the tongue Every 5 (Five) Minutes As Needed for Chest Pain. Take no more than 3 doses in 15 minutes.     Dispense:  25 tablet     Refill:  3           This document has been electronically signed by Gio Merino Jr., APRADELE  March 7, 2024 09:46 EST

## 2024-03-08 NOTE — PROGRESS NOTES
Elevated glucose at 105 otherwise normal CMP.    Normal CBC,    Lipid panel identified triglycerides 108, HDL of 42, LDL of 87.  Due to patient's significant history of coronary artery disease I would like to see if he would be willing to go to the lipid clinic to further reduce his cholesterol.  New goals are having the LDL around 55.    Normal TSH and magnesium

## 2024-03-11 ENCOUNTER — TELEPHONE (OUTPATIENT)
Dept: CARDIOLOGY | Facility: CLINIC | Age: 74
End: 2024-03-11
Payer: MEDICARE

## 2024-03-11 DIAGNOSIS — E78.2 MIXED HYPERLIPIDEMIA: Primary | ICD-10-CM

## 2024-03-11 NOTE — TELEPHONE ENCOUNTER
I called patient and went over lab results as listed below:    Elevated glucose at 105 otherwise normal CMP.    Normal CBC,    Lipid panel identified triglycerides 108, HDL of 42, LDL of 87.  Due to patient's significant history of coronary artery disease I would like to see if he would be willing to go to the lipid clinic to further reduce his cholesterol.  New goals are ha  ving the LDL around 55.    Normal TSH and magnesium

## 2024-03-12 ENCOUNTER — TELEPHONE (OUTPATIENT)
Dept: PHARMACY | Facility: HOSPITAL | Age: 74
End: 2024-03-12

## 2024-06-19 DIAGNOSIS — I25.10 ATHEROSCLEROSIS OF NATIVE CORONARY ARTERY OF NATIVE HEART WITHOUT ANGINA PECTORIS: ICD-10-CM

## 2024-06-19 RX ORDER — OMEPRAZOLE 20 MG/1
20 CAPSULE, DELAYED RELEASE ORAL DAILY
Qty: 90 CAPSULE | Refills: 1 | Status: SHIPPED | OUTPATIENT
Start: 2024-06-19

## 2024-09-10 ENCOUNTER — OFFICE VISIT (OUTPATIENT)
Dept: CARDIOLOGY | Facility: CLINIC | Age: 74
End: 2024-09-10
Payer: MEDICARE

## 2024-09-10 VITALS
WEIGHT: 162.6 LBS | BODY MASS INDEX: 24.08 KG/M2 | HEIGHT: 69 IN | OXYGEN SATURATION: 97 % | SYSTOLIC BLOOD PRESSURE: 135 MMHG | DIASTOLIC BLOOD PRESSURE: 77 MMHG | HEART RATE: 77 BPM

## 2024-09-10 DIAGNOSIS — I73.9 ASYMPTOMATIC PVD (PERIPHERAL VASCULAR DISEASE): ICD-10-CM

## 2024-09-10 DIAGNOSIS — E78.2 MIXED HYPERLIPIDEMIA: ICD-10-CM

## 2024-09-10 DIAGNOSIS — I25.10 ATHEROSCLEROSIS OF NATIVE CORONARY ARTERY OF NATIVE HEART WITHOUT ANGINA PECTORIS: Primary | ICD-10-CM

## 2024-09-10 DIAGNOSIS — Z95.1 S/P CABG (CORONARY ARTERY BYPASS GRAFT): ICD-10-CM

## 2024-09-10 DIAGNOSIS — G47.33 OSA ON CPAP: ICD-10-CM

## 2024-09-10 DIAGNOSIS — I10 PRIMARY HYPERTENSION: ICD-10-CM

## 2024-09-10 PROCEDURE — 1160F RVW MEDS BY RX/DR IN RCRD: CPT | Performed by: NURSE PRACTITIONER

## 2024-09-10 PROCEDURE — 1159F MED LIST DOCD IN RCRD: CPT | Performed by: NURSE PRACTITIONER

## 2024-09-10 PROCEDURE — 3078F DIAST BP <80 MM HG: CPT | Performed by: NURSE PRACTITIONER

## 2024-09-10 PROCEDURE — 3075F SYST BP GE 130 - 139MM HG: CPT | Performed by: NURSE PRACTITIONER

## 2024-09-10 PROCEDURE — 99213 OFFICE O/P EST LOW 20 MIN: CPT | Performed by: NURSE PRACTITIONER

## 2024-09-10 NOTE — PROGRESS NOTES
Subjective     Ryland Trujillo is a 73 y.o. male who presents to day for Follow-up (Here for 6 mo. F/u), Coronary Artery Disease, Hyperlipidemia, Hypertension, and Sleep Apnea.    CHIEF COMPLIANT  Chief Complaint   Patient presents with    Follow-up     Here for 6 mo. F/u    Coronary Artery Disease    Hyperlipidemia    Hypertension    Sleep Apnea       Problem List Items Addressed This Visit          Cardiac and Vasculature    Hyperlipidemia    HTN (hypertension)    Atherosclerosis of native coronary artery of native heart without angina pectoris - Primary    S/P CABG (coronary artery bypass graft)    Asymptomatic PVD (peripheral vascular disease)       Sleep    MUSTAPHA on CPAP     Problem List:  1.Hyperlipidemia  1.1 echocardiogram 9/22: EF 56 to 60%, grade 1 diastolic dysfunction normal PA systolic pressures  2.HTN  3.Atherosclerosis of native Coronary artery of native heart without angina pectoris  4.CABG   5.Asymptomatic PVD  6.Multiple premature Ventricular Complexes  6.1 monitor 8/22: Predominantly normal sinus rhythm.  Rare PACs, frequent PACs with a burden of 9.78% with trigeminy bigeminy couplets and triplets.  13 runs of nonsustained ventricular tachycardia the longest being 3 beats and the fastest being 136 bpm  HPI  Ryland Trujillo is a 73-year-old male patient who is being followed up today for chronic arterial hypertension and coronary artery disease.    `Patient does have a history of chronic arterial hypertension in which she is on metoprolol succinate, lisinopril-hydrochlorothiazide, and amlodipine.  Today's blood pressure is 135/77 heart rate is 77.  Says that his blood pressure has been running good.      We did review his history of coronary artery bypass grafting in which he is on rosuvastatin and Zetia as well as antiplatelet therapy of aspirin.  He denies any angina anginal equivalent symptoms at this time.  He is doing well from the cardiovascular standpoint per his report.  He says he has no limitations  and he pretty much does what he wants to.  He says the only thing is that he is pretty careful when he lifts heavy things. He is on high intensity statin therapy of rosuvastatin with Zetia in addition.    Patient continues to have palpitations that occur intermittently.  He did have approximately 9.78% PVC burden.  He says that he only has palpitations if he overdoes it.  He is on rate control of metoprolol.  Patient does have palpitations that occur intermittently in his chest and when he says it feels just a little different.  He says it happens maybe 1 time a week and only last few seconds.    Overall he feels like he is doing well from a cardiovascular standpoint.  He denies any angina anginal equivalent symptoms.  Patient denies any chest pain, shortness of breath, lower extremity edema, fatigue, syncope, PND, orthopnea, or strokelike symptoms.      PRIOR MEDS  Current Outpatient Medications on File Prior to Visit   Medication Sig Dispense Refill    amLODIPine (NORVASC) 10 MG tablet Take 1 tablet by mouth Daily. 90 tablet 3    aspirin 81 MG EC tablet Take 1 tablet by mouth Daily. 90 tablet 1    cilostazol (PLETAL) 100 MG tablet Take 1 tablet by mouth 2 (Two) Times a Day. 180 tablet 3    coenzyme Q10 100 MG capsule Take 2 capsules by mouth Daily. 180 capsule 3    ezetimibe (ZETIA) 10 MG tablet Take 1 tablet by mouth Daily. 90 tablet 3    ibuprofen (ADVIL,MOTRIN) 200 MG tablet Take 1 tablet by mouth Every 6 (Six) Hours As Needed for Mild Pain.      lisinopril-hydrochlorothiazide (PRINZIDE,ZESTORETIC) 20-25 MG per tablet Take 1 tablet by mouth Daily. 90 tablet 3    metoprolol succinate XL (TOPROL-XL) 100 MG 24 hr tablet Take 1 tablet by mouth Daily. 90 tablet 3    omeprazole (priLOSEC) 20 MG capsule TAKE 1 CAPSULE BY MOUTH ONCE DAILY 90 capsule 1    rosuvastatin (CRESTOR) 40 MG tablet Take 1 tablet by mouth Daily. 90 tablet 3    nitroglycerin (NITROSTAT) 0.4 MG SL tablet Place 1 tablet under the tongue Every 5  "(Five) Minutes As Needed for Chest Pain. Take no more than 3 doses in 15 minutes. (Patient not taking: Reported on 9/10/2024) 25 tablet 3     No current facility-administered medications on file prior to visit.       ALLERGIES  Patient has no known allergies.    HISTORY  Past Medical History:   Diagnosis Date    Acid reflux     CAD (coronary artery disease)     HTN (hypertension)     Hyperlipidemia     PVD (peripheral vascular disease)     Rheumatoid arthritis     Sleep apnea        Social History     Socioeconomic History    Marital status:    Tobacco Use    Smoking status: Former     Current packs/day: 0.00     Average packs/day: 1 pack/day for 30.0 years (30.0 ttl pk-yrs)     Types: Cigarettes     Start date:      Quit date:      Years since quittin.7     Passive exposure: Past    Smokeless tobacco: Never   Vaping Use    Vaping status: Never Used   Substance and Sexual Activity    Alcohol use: Yes     Comment: occasional    Drug use: Never    Sexual activity: Defer       Family History   Problem Relation Age of Onset    Heart disease Mother     Hypertension Father     Hyperlipidemia Father        Review of Systems   Constitutional: Negative.    HENT: Negative.     Eyes:  Positive for visual disturbance (glasses).   Respiratory: Negative.     Cardiovascular:  Positive for palpitations (with \"over doing it\"). Negative for chest pain and leg swelling.   Gastrointestinal: Negative.    Endocrine: Negative.    Genitourinary: Negative.    Musculoskeletal:  Positive for arthralgias and myalgias.   Skin: Negative.    Allergic/Immunologic: Negative.    Neurological: Negative.    Hematological: Negative.    Psychiatric/Behavioral: Negative.         Objective     VITALS: /77 (BP Location: Left arm, Patient Position: Sitting)   Pulse 77   Ht 175.3 cm (69.02\")   Wt 73.8 kg (162 lb 9.6 oz)   SpO2 97%   BMI 24.00 kg/m²     LABS:   Lab Results (most recent)       None            IMAGING:   No Images " in the past 120 days found..    EXAM:  Physical Exam  Vitals and nursing note reviewed.   Constitutional:       Appearance: He is well-developed.   HENT:      Head: Normocephalic.   Neck:      Thyroid: No thyroid mass.      Vascular: No carotid bruit or JVD.      Trachea: Trachea and phonation normal.   Cardiovascular:      Rate and Rhythm: Normal rate and regular rhythm.      Pulses:           Radial pulses are 2+ on the right side and 2+ on the left side.        Posterior tibial pulses are 2+ on the right side and 2+ on the left side.      Heart sounds: Normal heart sounds. Murmur heard.      No friction rub. No gallop.   Pulmonary:      Effort: Pulmonary effort is normal. No respiratory distress.      Breath sounds: Normal breath sounds. No wheezing or rales.   Musculoskeletal:         General: No swelling. Normal range of motion.      Cervical back: Neck supple.   Skin:     General: Skin is warm and dry.      Capillary Refill: Capillary refill takes less than 2 seconds.      Findings: No rash.   Neurological:      Mental Status: He is alert and oriented to person, place, and time.   Psychiatric:         Speech: Speech normal.         Behavior: Behavior normal.         Thought Content: Thought content normal.         Judgment: Judgment normal.         Procedure   Procedures       Assessment & Plan    Diagnosis Plan   1. Atherosclerosis of native coronary artery of native heart without angina pectoris        2. Asymptomatic PVD (peripheral vascular disease)        3. Primary hypertension        4. Mixed hyperlipidemia        5. S/P CABG (coronary artery bypass graft)        6. MUSTAPHA on CPAP        Patient does have a history of coronary artery disease with coronary artery bypass grafting.  Overall he is doing well.  Has a reasonable functional capacity without anginal symptoms.  Will continue his current medication regimen without change.  Patient's blood pressure is controlled on current blood pressure medication  regimen.  No medication changes are warranted at this time.  Patient advised to monitor blood pressure on a daily basis and report any persistent highs or lows.  Set goal blood pressure for patient at 130/80 or below.  Patient does have mixed hyperlipidemia which he is still in the 80s on his LDL.  We did discuss PSK 9 inhibitors due to his coronary artery disease and goal of 55.  At this time he wishes to defer.  Patient does have a history of peripheral vascular disease in which she denies any claudication-like symptoms.  He will continue his Pletal.  Informed of signs and symptoms of ACS and advised to seek emergent treatment for any new worsening symptoms.  Patient also advised sooner follow-up as needed.  Also advised to follow-up with family doctor as needed  This note is dictated utilizing voice recognition software.  Although this record has been proof read, transcriptional errors may still be present. If questions occur regarding the content of this record please do not hesitate to call our office.  I have reviewed and confirmed the accuracy of the ROS as documented by the MA/LPN/RN HILDA Webster    No follow-ups on file.    Diagnoses and all orders for this visit:    1. Atherosclerosis of native coronary artery of native heart without angina pectoris (Primary)    2. Asymptomatic PVD (peripheral vascular disease)    3. Primary hypertension    4. Mixed hyperlipidemia    5. S/P CABG (coronary artery bypass graft)    6. MUSTAPHA on CPAP        Ryland Trujillo  reports that he quit smoking about 19 years ago. His smoking use included cigarettes. He started smoking about 49 years ago. He has a 30 pack-year smoking history. He has been exposed to tobacco smoke. He has never used smokeless tobacco. I have educated him on the risk of diseases from using tobacco products such as cancer, COPD, and heart disease.     Advance Care Planning   ACP discussion was held with the patient during this visit. Patient does not  have an advance directive, declines further assistance.               BMI is within normal parameters. No other follow-up for BMI required.           MEDS ORDERED DURING VISIT:  No orders of the defined types were placed in this encounter.          This document has been electronically signed by Gio Merino Jr., HILDA  September 10, 2024 08:44 EDT

## 2024-10-31 ENCOUNTER — OFFICE VISIT (OUTPATIENT)
Dept: PULMONOLOGY | Facility: CLINIC | Age: 74
End: 2024-10-31
Payer: MEDICARE

## 2024-10-31 VITALS
OXYGEN SATURATION: 100 % | SYSTOLIC BLOOD PRESSURE: 128 MMHG | HEART RATE: 63 BPM | BODY MASS INDEX: 23.4 KG/M2 | WEIGHT: 158 LBS | HEIGHT: 69 IN | DIASTOLIC BLOOD PRESSURE: 72 MMHG

## 2024-10-31 DIAGNOSIS — G47.33 OSA ON CPAP: Primary | ICD-10-CM

## 2024-10-31 DIAGNOSIS — R09.82 POST-NASAL DRIP: ICD-10-CM

## 2024-10-31 RX ORDER — FLUTICASONE PROPIONATE 50 MCG
1 SPRAY, SUSPENSION (ML) NASAL DAILY
Qty: 11.1 ML | Refills: 5 | Status: SHIPPED | OUTPATIENT
Start: 2024-10-31

## 2024-10-31 RX ORDER — LISINOPRIL AND HYDROCHLOROTHIAZIDE 20; 25 MG/1; MG/1
1 TABLET ORAL DAILY
COMMUNITY

## 2024-10-31 RX ORDER — NEOMYCIN SULFATE, POLYMYXIN B SULFATE AND HYDROCORTISONE 10; 3.5; 1 MG/ML; MG/ML; [USP'U]/ML
SUSPENSION/ DROPS AURICULAR (OTIC) EVERY 8 HOURS SCHEDULED
COMMUNITY
Start: 2024-10-03

## 2024-10-31 NOTE — PROGRESS NOTES
Follow Up Office Visit      Patient Name: Ryland Trujillo    Chief Complaint:    Chief Complaint   Patient presents with    Sleeping Problem     MUSTAPHA on CPAP       History of Present Illness: Ryland Trujillo is a 74 y.o. male who is here today for follow up of sleep apnea.  Since last visit, he notes that he puts his CPAP on nearly every night, though has had some trouble tolerating it more than a couple of hours because he has been having some nasal congestion and postnasal drip, which will cause him to need to take his mask off during the night he notes that he.  Usually only sleeps in 3 to 4-hour intervals, which has been his sleep pattern his whole life.  When he is able to wear PAP, he does feel that he is resting better.    Subjective      Review of Systems:  Review of Systems   Constitutional:  Negative for fever and unexpected weight change.   HENT:  Positive for congestion, hearing loss and postnasal drip.    Respiratory:  Negative for cough, shortness of breath and wheezing.    Cardiovascular:  Negative for chest pain and leg swelling.        Past Medical History:   Past Medical History:   Diagnosis Date    Acid reflux     CAD (coronary artery disease)     HTN (hypertension)     Hyperlipidemia     PVD (peripheral vascular disease)     Rheumatoid arthritis     Sleep apnea        Past Surgical History:   Past Surgical History:   Procedure Laterality Date    CORONARY ARTERY BYPASS GRAFT      THYROID SURGERY      Right thyroid removal        Family History:   Family History   Problem Relation Age of Onset    Heart disease Mother     Hypertension Father     Hyperlipidemia Father        Social History:   Social History     Socioeconomic History    Marital status:    Tobacco Use    Smoking status: Former     Current packs/day: 0.00     Average packs/day: 1 pack/day for 30.0 years (30.0 ttl pk-yrs)     Types: Cigarettes     Start date:      Quit date:      Years since quittin.8     Passive  "exposure: Past    Smokeless tobacco: Never   Vaping Use    Vaping status: Never Used   Substance and Sexual Activity    Alcohol use: Yes     Comment: occasional    Drug use: Never    Sexual activity: Defer       Current Medications:     Current Outpatient Medications:     amLODIPine (NORVASC) 10 MG tablet, Take 1 tablet by mouth Daily., Disp: 90 tablet, Rfl: 3    aspirin 81 MG EC tablet, Take 1 tablet by mouth Daily., Disp: 90 tablet, Rfl: 1    cilostazol (PLETAL) 100 MG tablet, Take 1 tablet by mouth 2 (Two) Times a Day., Disp: 180 tablet, Rfl: 3    ezetimibe (ZETIA) 10 MG tablet, Take 1 tablet by mouth Daily., Disp: 90 tablet, Rfl: 3    ibuprofen (ADVIL,MOTRIN) 200 MG tablet, Take 1 tablet by mouth Every 6 (Six) Hours As Needed for Mild Pain., Disp: , Rfl:     metoprolol succinate XL (TOPROL-XL) 100 MG 24 hr tablet, Take 1 tablet by mouth Daily., Disp: 90 tablet, Rfl: 3    neomycin-polymyxin-hydrocortisone (CORTISPORIN) 3.5-32425-9 otic suspension, Every 8 (Eight) Hours., Disp: , Rfl:     nitroglycerin (NITROSTAT) 0.4 MG SL tablet, Place 1 tablet under the tongue Every 5 (Five) Minutes As Needed for Chest Pain. Take no more than 3 doses in 15 minutes., Disp: 25 tablet, Rfl: 3    omeprazole (priLOSEC) 20 MG capsule, TAKE 1 CAPSULE BY MOUTH ONCE DAILY, Disp: 90 capsule, Rfl: 1    rosuvastatin (CRESTOR) 40 MG tablet, Take 1 tablet by mouth Daily., Disp: 90 tablet, Rfl: 3     Allergies:   No Known Allergies    Objective     Physical Exam:  Vital Signs:   Vitals:    10/31/24 1004   BP: 128/72   Pulse: 63   SpO2: 100%   Weight: 71.7 kg (158 lb)   Height: 175.3 cm (69\")     Body mass index is 23.33 kg/m².    Physical Exam  Vitals reviewed.   Constitutional:       General: He is not in acute distress.     Appearance: He is not toxic-appearing.   HENT:      Head: Normocephalic and atraumatic.      Mouth/Throat:      Mouth: Mucous membranes are moist.   Eyes:      Extraocular Movements: Extraocular movements intact.      " Conjunctiva/sclera: Conjunctivae normal.   Cardiovascular:      Rate and Rhythm: Normal rate.      Heart sounds: Normal heart sounds.   Pulmonary:      Effort: Pulmonary effort is normal.      Breath sounds: Normal breath sounds.   Abdominal:      General: There is no distension.      Palpations: Abdomen is soft.   Musculoskeletal:         General: No swelling.      Cervical back: Neck supple.   Skin:     General: Skin is warm and dry.      Findings: No rash.   Neurological:      General: No focal deficit present.      Mental Status: He is alert and oriented to person, place, and time.   Psychiatric:         Mood and Affect: Mood normal.         Behavior: Behavior normal.       Results Review:   September - October 2024 CPAP compliance report showed 97% usage days with average of 2 hours 12 minutes on AutoPap at 6-20 cmH2O.    Assessment / Plan      Assessment/Plan:   Diagnoses and all orders for this visit:    1. MUSTAPHA on CPAP (Primary)  Recent compliance report reviewed and discussed with patient.  Recommend continuing on CPAP nightly for minimum of 4 hours.  He thinks that his sleep study was originally performed at [facility], and I do not see that we have ever obtained a copy of the report. Will ask that the staff contact [facility] and/or his PCP and [employer] company for a copy of the original sleep study report.    2. Post-nasal drip  -     fluticasone (FLONASE) 50 MCG/ACT nasal spray; Administer 1 spray into the nostril(s) as directed by provider Daily.  Dispense: 11.1 mL; Refill: 5  Discussed possible side effects of medications. Risk and benefits of the medication were discussed with patient.      Follow Up:   Return in about 1 year (around 10/31/2025) for Recheck.  The patient was counseled on diagnostic results, risks and benefits of treatment options, risk factor modifications and the importance of treatment compliance. The patient was advised to contact the clinic with concerns or worsening  symptoms.     HILDA Dumont   Pulmonary [facility] [place]     This document has been electronically signed by HILDA Dumont  October 31, 2024

## 2025-02-08 DIAGNOSIS — R07.2 PRECORDIAL PAIN: ICD-10-CM

## 2025-02-08 DIAGNOSIS — I25.10 ATHEROSCLEROSIS OF NATIVE CORONARY ARTERY OF NATIVE HEART WITHOUT ANGINA PECTORIS: ICD-10-CM

## 2025-02-10 RX ORDER — NITROGLYCERIN 0.4 MG/1
TABLET SUBLINGUAL
Qty: 25 TABLET | Refills: 3 | Status: SHIPPED | OUTPATIENT
Start: 2025-02-10

## 2025-02-18 ENCOUNTER — OFFICE VISIT (OUTPATIENT)
Dept: CARDIOLOGY | Facility: CLINIC | Age: 75
End: 2025-02-18
Payer: MEDICARE

## 2025-02-18 ENCOUNTER — LAB (OUTPATIENT)
Dept: CARDIOLOGY | Facility: CLINIC | Age: 75
End: 2025-02-18
Payer: MEDICARE

## 2025-02-18 VITALS
HEIGHT: 69 IN | BODY MASS INDEX: 23.25 KG/M2 | DIASTOLIC BLOOD PRESSURE: 68 MMHG | HEART RATE: 76 BPM | OXYGEN SATURATION: 99 % | WEIGHT: 157 LBS | SYSTOLIC BLOOD PRESSURE: 104 MMHG

## 2025-02-18 DIAGNOSIS — E78.2 MIXED HYPERLIPIDEMIA: ICD-10-CM

## 2025-02-18 DIAGNOSIS — I10 HYPERTENSION, UNSPECIFIED TYPE: ICD-10-CM

## 2025-02-18 DIAGNOSIS — E78.5 HYPERLIPIDEMIA, UNSPECIFIED HYPERLIPIDEMIA TYPE: ICD-10-CM

## 2025-02-18 DIAGNOSIS — Z95.1 S/P CABG (CORONARY ARTERY BYPASS GRAFT): ICD-10-CM

## 2025-02-18 DIAGNOSIS — I49.3 PREMATURE VENTRICULAR COMPLEX: ICD-10-CM

## 2025-02-18 DIAGNOSIS — I10 PRIMARY HYPERTENSION: Primary | ICD-10-CM

## 2025-02-18 DIAGNOSIS — I25.10 ATHEROSCLEROSIS OF NATIVE CORONARY ARTERY OF NATIVE HEART WITHOUT ANGINA PECTORIS: ICD-10-CM

## 2025-02-18 DIAGNOSIS — I10 PRIMARY HYPERTENSION: ICD-10-CM

## 2025-02-18 LAB
ALBUMIN SERPL-MCNC: 4.7 G/DL (ref 3.5–5.2)
ALBUMIN/GLOB SERPL: 1.8 G/DL
ALP SERPL-CCNC: 99 U/L (ref 39–117)
ALT SERPL W P-5'-P-CCNC: 14 U/L (ref 1–41)
ANION GAP SERPL CALCULATED.3IONS-SCNC: 7.7 MMOL/L (ref 5–15)
AST SERPL-CCNC: 19 U/L (ref 1–40)
BASOPHILS # BLD AUTO: 0.06 10*3/MM3 (ref 0–0.2)
BASOPHILS NFR BLD AUTO: 0.7 % (ref 0–1.5)
BILIRUB SERPL-MCNC: 0.6 MG/DL (ref 0–1.2)
BUN SERPL-MCNC: 13 MG/DL (ref 8–23)
BUN/CREAT SERPL: 12.6 (ref 7–25)
CALCIUM SPEC-SCNC: 9.7 MG/DL (ref 8.6–10.5)
CHLORIDE SERPL-SCNC: 98 MMOL/L (ref 98–107)
CHOLEST SERPL-MCNC: 117 MG/DL (ref 0–200)
CO2 SERPL-SCNC: 29.3 MMOL/L (ref 22–29)
CREAT SERPL-MCNC: 1.03 MG/DL (ref 0.76–1.27)
DEPRECATED RDW RBC AUTO: 42.5 FL (ref 37–54)
EGFRCR SERPLBLD CKD-EPI 2021: 76.7 ML/MIN/1.73
EOSINOPHIL # BLD AUTO: 0.11 10*3/MM3 (ref 0–0.4)
EOSINOPHIL NFR BLD AUTO: 1.2 % (ref 0.3–6.2)
ERYTHROCYTE [DISTWIDTH] IN BLOOD BY AUTOMATED COUNT: 12.3 % (ref 12.3–15.4)
GLOBULIN UR ELPH-MCNC: 2.6 GM/DL
GLUCOSE SERPL-MCNC: 99 MG/DL (ref 65–99)
HCT VFR BLD AUTO: 38.3 % (ref 37.5–51)
HDLC SERPL-MCNC: 46 MG/DL (ref 40–60)
HGB BLD-MCNC: 13.1 G/DL (ref 13–17.7)
IMM GRANULOCYTES # BLD AUTO: 0.03 10*3/MM3 (ref 0–0.05)
IMM GRANULOCYTES NFR BLD AUTO: 0.3 % (ref 0–0.5)
LDLC SERPL CALC-MCNC: 54 MG/DL (ref 0–100)
LDLC/HDLC SERPL: 1.17 {RATIO}
LYMPHOCYTES # BLD AUTO: 1.63 10*3/MM3 (ref 0.7–3.1)
LYMPHOCYTES NFR BLD AUTO: 17.9 % (ref 19.6–45.3)
MAGNESIUM SERPL-MCNC: 2.2 MG/DL (ref 1.6–2.4)
MCH RBC QN AUTO: 32 PG (ref 26.6–33)
MCHC RBC AUTO-ENTMCNC: 34.2 G/DL (ref 31.5–35.7)
MCV RBC AUTO: 93.6 FL (ref 79–97)
MONOCYTES # BLD AUTO: 0.58 10*3/MM3 (ref 0.1–0.9)
MONOCYTES NFR BLD AUTO: 6.4 % (ref 5–12)
NEUTROPHILS NFR BLD AUTO: 6.7 10*3/MM3 (ref 1.7–7)
NEUTROPHILS NFR BLD AUTO: 73.5 % (ref 42.7–76)
NRBC BLD AUTO-RTO: 0 /100 WBC (ref 0–0.2)
PLATELET # BLD AUTO: 345 10*3/MM3 (ref 140–450)
PMV BLD AUTO: 9 FL (ref 6–12)
POTASSIUM SERPL-SCNC: 4.7 MMOL/L (ref 3.5–5.2)
PROT SERPL-MCNC: 7.3 G/DL (ref 6–8.5)
RBC # BLD AUTO: 4.09 10*6/MM3 (ref 4.14–5.8)
SODIUM SERPL-SCNC: 135 MMOL/L (ref 136–145)
TRIGL SERPL-MCNC: 87 MG/DL (ref 0–150)
TSH SERPL DL<=0.05 MIU/L-ACNC: 1.91 UIU/ML (ref 0.27–4.2)
VLDLC SERPL-MCNC: 17 MG/DL (ref 5–40)
WBC NRBC COR # BLD AUTO: 9.11 10*3/MM3 (ref 3.4–10.8)

## 2025-02-18 PROCEDURE — 84443 ASSAY THYROID STIM HORMONE: CPT | Performed by: NURSE PRACTITIONER

## 2025-02-18 PROCEDURE — 3074F SYST BP LT 130 MM HG: CPT | Performed by: NURSE PRACTITIONER

## 2025-02-18 PROCEDURE — 1159F MED LIST DOCD IN RCRD: CPT | Performed by: NURSE PRACTITIONER

## 2025-02-18 PROCEDURE — 80061 LIPID PANEL: CPT | Performed by: NURSE PRACTITIONER

## 2025-02-18 PROCEDURE — 36415 COLL VENOUS BLD VENIPUNCTURE: CPT

## 2025-02-18 PROCEDURE — 93000 ELECTROCARDIOGRAM COMPLETE: CPT | Performed by: NURSE PRACTITIONER

## 2025-02-18 PROCEDURE — 99214 OFFICE O/P EST MOD 30 MIN: CPT | Performed by: NURSE PRACTITIONER

## 2025-02-18 PROCEDURE — 83735 ASSAY OF MAGNESIUM: CPT | Performed by: NURSE PRACTITIONER

## 2025-02-18 PROCEDURE — 3078F DIAST BP <80 MM HG: CPT | Performed by: NURSE PRACTITIONER

## 2025-02-18 PROCEDURE — 1160F RVW MEDS BY RX/DR IN RCRD: CPT | Performed by: NURSE PRACTITIONER

## 2025-02-18 PROCEDURE — 80053 COMPREHEN METABOLIC PANEL: CPT | Performed by: NURSE PRACTITIONER

## 2025-02-18 PROCEDURE — 85025 COMPLETE CBC W/AUTO DIFF WBC: CPT | Performed by: NURSE PRACTITIONER

## 2025-02-18 RX ORDER — AMLODIPINE BESYLATE 10 MG/1
10 TABLET ORAL DAILY
Qty: 90 TABLET | Refills: 3 | Status: SHIPPED | OUTPATIENT
Start: 2025-02-18

## 2025-02-18 RX ORDER — ROSUVASTATIN CALCIUM 40 MG/1
40 TABLET, COATED ORAL DAILY
Qty: 90 TABLET | Refills: 3 | Status: SHIPPED | OUTPATIENT
Start: 2025-02-18

## 2025-02-18 RX ORDER — ASPIRIN 81 MG/1
81 TABLET ORAL DAILY
Qty: 90 TABLET | Refills: 1 | Status: SHIPPED | OUTPATIENT
Start: 2025-02-18

## 2025-02-18 RX ORDER — METOPROLOL SUCCINATE 100 MG/1
100 TABLET, EXTENDED RELEASE ORAL DAILY
Qty: 90 TABLET | Refills: 3 | Status: SHIPPED | OUTPATIENT
Start: 2025-02-18

## 2025-02-18 RX ORDER — LISINOPRIL AND HYDROCHLOROTHIAZIDE 20; 25 MG/1; MG/1
1 TABLET ORAL DAILY
Qty: 90 TABLET | Refills: 3 | Status: SHIPPED | OUTPATIENT
Start: 2025-02-18

## 2025-02-18 RX ORDER — EZETIMIBE 10 MG/1
10 TABLET ORAL DAILY
Qty: 90 TABLET | Refills: 3 | Status: SHIPPED | OUTPATIENT
Start: 2025-02-18

## 2025-02-18 NOTE — PROGRESS NOTES
CMP was relatively normal with a mildly decreased sodium of 135.    Normal CBC, TSH, magnesium.    Lipid panel showed triglycerides 87, HDL 46, LDL 54.  All within goal.    Continue current medications without change.  Keep follow-up.

## 2025-02-18 NOTE — PROGRESS NOTES
Subjective     Ryland Trujillo is a 74 y.o. male who presents to day for Follow-up and Hypertension.    CHIEF COMPLIANT  Chief Complaint   Patient presents with    Follow-up    Hypertension       Active Problems:  Problem List Items Addressed This Visit          Cardiac and Vasculature    Hyperlipidemia    Relevant Medications    ezetimibe (ZETIA) 10 MG tablet    rosuvastatin (CRESTOR) 40 MG tablet    Other Relevant Orders    ECG 12 Lead    CBC & Differential    Comprehensive Metabolic Panel    TSH    Lipid Panel    Magnesium    ECG 12 Lead    HTN (hypertension) - Primary    Relevant Medications    amLODIPine (NORVASC) 10 MG tablet    lisinopril-hydrochlorothiazide (PRINZIDE,ZESTORETIC) 20-25 MG per tablet    metoprolol succinate XL (TOPROL-XL) 100 MG 24 hr tablet    Other Relevant Orders    ECG 12 Lead    CBC & Differential    Comprehensive Metabolic Panel    TSH    Lipid Panel    Magnesium    ECG 12 Lead    Atherosclerosis of native coronary artery of native heart without angina pectoris    Relevant Medications    amLODIPine (NORVASC) 10 MG tablet    aspirin 81 MG EC tablet    metoprolol succinate XL (TOPROL-XL) 100 MG 24 hr tablet    Other Relevant Orders    ECG 12 Lead    S/P CABG (coronary artery bypass graft)    Relevant Orders    ECG 12 Lead    CBC & Differential    Comprehensive Metabolic Panel    TSH    Lipid Panel    Magnesium    Premature ventricular complex    Relevant Medications    amLODIPine (NORVASC) 10 MG tablet    metoprolol succinate XL (TOPROL-XL) 100 MG 24 hr tablet    Other Relevant Orders    ECG 12 Lead    CBC & Differential    Comprehensive Metabolic Panel    TSH    Lipid Panel    Magnesium   Problem List:  1.Hyperlipidemia  1.1 echocardiogram 9/22: EF 56 to 60%, grade 1 diastolic dysfunction normal PA systolic pressures  2.HTN  3.Atherosclerosis of native Coronary artery of native heart without angina pectoris  4.CABG   5.Asymptomatic PVD  6.Multiple premature Ventricular Complexes  6.1 monitor  8/22: Predominantly normal sinus rhythm.  Rare PACs, frequent PACs with a burden of 9.78% with trigeminy bigeminy couplets and triplets.  13 runs of nonsustained ventricular tachycardia the longest being 3 beats and the fastest being 136 bpm    HPI  HPI  Ryland Trujillo is a 73-year-old male patient who is being followed up today for chronic arterial hypertension.      Patient does have a history of chronic arterial hypertension in which she is on metoprolol succinate, lisinopril-hydrochlorothiazide, and amlodipine.  Today's blood pressure is 1 104/68 heart rate of 76.    We did review his labs that was done approximately 11 months ago which identified a relatively normal CBC CMP TSH.  His triglycerides 108, HDL 42, LDL of 87.    We did review his history of coronary artery bypass grafting in which he is on rosuvastatin and Zetia as well as antiplatelet therapy of aspirin.  He denies any angina anginal equivalent symptoms at this time.  He is doing well from the cardiovascular standpoint per his report.  He says he has no limitations and he pretty much does what he wants to.  Patient denies any chest pain, shortness of breath, palpitations, lower extremity edema, fatigue, syncope, PND, orthopnea, or strokelike symptoms.  PRIOR MEDS  Current Outpatient Medications on File Prior to Visit   Medication Sig Dispense Refill    cilostazol (PLETAL) 100 MG tablet Take 1 tablet by mouth 2 (Two) Times a Day. 180 tablet 3    nitroglycerin (NITROSTAT) 0.4 MG SL tablet Dissolve 1 tablet under tongue at onset of chest pain. May repeat every 5 minutes for up to 3 times total if you need. Call 911 & seek emergency care if no relief with 3rd tablet. 25 tablet 3    [DISCONTINUED] amLODIPine (NORVASC) 10 MG tablet Take 1 tablet by mouth Daily. 90 tablet 3    [DISCONTINUED] aspirin 81 MG EC tablet Take 1 tablet by mouth Daily. 90 tablet 1    [DISCONTINUED] ezetimibe (ZETIA) 10 MG tablet Take 1 tablet by mouth Daily. 90 tablet 3     [DISCONTINUED] lisinopril-hydrochlorothiazide (PRINZIDE,ZESTORETIC) 20-25 MG per tablet Take 1 tablet by mouth Daily.      [DISCONTINUED] metoprolol succinate XL (TOPROL-XL) 100 MG 24 hr tablet Take 1 tablet by mouth Daily. 90 tablet 3    [DISCONTINUED] rosuvastatin (CRESTOR) 40 MG tablet Take 1 tablet by mouth Daily. 90 tablet 3    [DISCONTINUED] fluticasone (FLONASE) 50 MCG/ACT nasal spray Administer 1 spray into the nostril(s) as directed by provider Daily. 11.1 mL 5    [DISCONTINUED] ibuprofen (ADVIL,MOTRIN) 200 MG tablet Take 1 tablet by mouth Every 6 (Six) Hours As Needed for Mild Pain.      [DISCONTINUED] neomycin-polymyxin-hydrocortisone (CORTISPORIN) 3.5-73242-4 otic suspension Every 8 (Eight) Hours.      [DISCONTINUED] omeprazole (priLOSEC) 20 MG capsule TAKE 1 CAPSULE BY MOUTH ONCE DAILY 90 capsule 1     No current facility-administered medications on file prior to visit.       ALLERGIES  Patient has no known allergies.    HISTORY  Past Medical History:   Diagnosis Date    Acid reflux     CAD (coronary artery disease)     HTN (hypertension)     Hyperlipidemia     PVD (peripheral vascular disease)     Rheumatoid arthritis     Sleep apnea        Social History     Socioeconomic History    Marital status:    Tobacco Use    Smoking status: Former     Current packs/day: 0.00     Average packs/day: 1 pack/day for 30.0 years (30.0 ttl pk-yrs)     Types: Cigarettes     Start date:      Quit date:      Years since quittin.1     Passive exposure: Past    Smokeless tobacco: Never   Vaping Use    Vaping status: Never Used   Substance and Sexual Activity    Alcohol use: Yes     Comment: occasional    Drug use: Never    Sexual activity: Defer       Family History   Problem Relation Age of Onset    Heart disease Mother     Hypertension Father     Hyperlipidemia Father        Review of Systems   Constitutional:  Positive for fatigue. Negative for chills and fever.   HENT:  Negative for congestion,  "rhinorrhea and sore throat.    Eyes:  Negative for visual disturbance.   Respiratory:  Positive for apnea (CPAP). Negative for chest tightness and shortness of breath.    Cardiovascular:  Negative for chest pain, palpitations and leg swelling.   Gastrointestinal:  Negative for constipation, diarrhea and nausea.   Musculoskeletal:  Negative for arthralgias, back pain and neck pain.   Skin:  Negative for rash and wound.   Allergic/Immunologic: Negative for environmental allergies and food allergies.   Neurological:  Negative for dizziness, syncope, weakness and light-headedness.   Hematological:  Bruises/bleeds easily (bleeds easy).   Psychiatric/Behavioral:  Negative for sleep disturbance.        Objective     VITALS: /68 (BP Location: Left arm, Patient Position: Sitting, Cuff Size: Adult)   Pulse 76   Ht 175.3 cm (69.02\")   Wt 71.2 kg (157 lb)   SpO2 99%   BMI 23.17 kg/m²     LABS:   Lab Results (most recent)       None            IMAGING:   No Images in the past 120 days found..    EXAM:  Physical Exam  Vitals and nursing note reviewed.   Constitutional:       Appearance: He is well-developed.   HENT:      Head: Normocephalic.   Neck:      Thyroid: No thyroid mass.      Vascular: No carotid bruit or JVD.      Trachea: Trachea and phonation normal.   Cardiovascular:      Rate and Rhythm: Normal rate and regular rhythm.      Pulses:           Radial pulses are 2+ on the right side and 2+ on the left side.        Posterior tibial pulses are 2+ on the right side and 2+ on the left side.      Heart sounds: Murmur heard.      No friction rub. No gallop.   Pulmonary:      Effort: Pulmonary effort is normal. No respiratory distress.      Breath sounds: Normal breath sounds. No wheezing or rales.   Musculoskeletal:         General: No swelling. Normal range of motion.      Cervical back: Neck supple.   Skin:     General: Skin is warm and dry.      Capillary Refill: Capillary refill takes less than 2 seconds.      " Findings: No rash.   Neurological:      Mental Status: He is alert and oriented to person, place, and time.   Psychiatric:         Speech: Speech normal.         Behavior: Behavior normal.         Thought Content: Thought content normal.         Judgment: Judgment normal.         Procedure     ECG 12 Lead    Date/Time: 2/18/2025 8:26 AM  Performed by: Gio Merino APRN    Authorized by: Gio Merino APRN  Comparison: compared with previous ECG from 3/7/2024  Similar to previous ECG  Rhythm: sinus rhythm  Ectopy: unifocal PVCs  Rate: normal  BPM: 70  QRS axis: normal  Other findings: non-specific ST-T wave changes  Comments: QTc 383 ms  No acute changes             Assessment & Plan    Diagnosis Plan   1. Primary hypertension  ECG 12 Lead    CBC & Differential    Comprehensive Metabolic Panel    TSH    Lipid Panel    Magnesium    amLODIPine (NORVASC) 10 MG tablet      2. S/P CABG (coronary artery bypass graft)  ECG 12 Lead    CBC & Differential    Comprehensive Metabolic Panel    TSH    Lipid Panel    Magnesium      3. Premature ventricular complex  ECG 12 Lead    CBC & Differential    Comprehensive Metabolic Panel    TSH    Lipid Panel    Magnesium      4. Mixed hyperlipidemia  ECG 12 Lead    CBC & Differential    Comprehensive Metabolic Panel    TSH    Lipid Panel    Magnesium    ezetimibe (ZETIA) 10 MG tablet      5. Atherosclerosis of native coronary artery of native heart without angina pectoris  ECG 12 Lead    aspirin 81 MG EC tablet      6. Hypertension, unspecified type  ECG 12 Lead    metoprolol succinate XL (TOPROL-XL) 100 MG 24 hr tablet      7. Hyperlipidemia, unspecified hyperlipidemia type  ECG 12 Lead    rosuvastatin (CRESTOR) 40 MG tablet      1.  Patient's blood pressure is controlled on current blood pressure medication regimen.  No medication changes are warranted at this time.  Patient advised to monitor blood pressure on a daily basis and report any persistent highs or lows.  Set goal  blood pressure for patient at 130/80 or below.  2.  Overall patient seems to be doing well from the cardiovascular standpoint.  He denies any significant angina anginal equivalent symptoms.  Will continue to follow at this time.  3.  Patient does have significant history of PVCs with approximately a 10% burden.  He is on metoprolol 100 mg daily.  He denies any palpitations.  Will continue to follow at this time.  4.  Due to patient's history of hyperlipidemia and coronary artery disease we will get an extensive laboratory workup including CBC CMP TSH magnesium and lipid panel.  5.  Informed of signs and symptoms of ACS and advised to seek emergent treatment for any new worsening symptoms.  Patient also advised sooner follow-up as needed.  Also advised to follow-up with family doctor as needed  This note is dictated utilizing voice recognition software.  Although this record has been proof read, transcriptional errors may still be present. If questions occur regarding the content of this record please do not hesitate to call our office.  I have reviewed and confirmed the accuracy of the ROS as documented by the MA/LPN/RN HILDA Webster    Return in about 6 months (around 8/18/2025), or if symptoms worsen or fail to improve.    Diagnoses and all orders for this visit:    1. Primary hypertension (Primary)  -     ECG 12 Lead  -     CBC & Differential; Future  -     Comprehensive Metabolic Panel; Future  -     TSH; Future  -     Lipid Panel; Future  -     Magnesium; Future  -     amLODIPine (NORVASC) 10 MG tablet; Take 1 tablet by mouth Daily.  Dispense: 90 tablet; Refill: 3    2. S/P CABG (coronary artery bypass graft)  -     ECG 12 Lead  -     CBC & Differential; Future  -     Comprehensive Metabolic Panel; Future  -     TSH; Future  -     Lipid Panel; Future  -     Magnesium; Future    3. Premature ventricular complex  -     ECG 12 Lead  -     CBC & Differential; Future  -     Comprehensive Metabolic Panel;  Future  -     TSH; Future  -     Lipid Panel; Future  -     Magnesium; Future    4. Mixed hyperlipidemia  -     ECG 12 Lead  -     CBC & Differential; Future  -     Comprehensive Metabolic Panel; Future  -     TSH; Future  -     Lipid Panel; Future  -     Magnesium; Future  -     ezetimibe (ZETIA) 10 MG tablet; Take 1 tablet by mouth Daily.  Dispense: 90 tablet; Refill: 3    5. Atherosclerosis of native coronary artery of native heart without angina pectoris  -     ECG 12 Lead  -     aspirin 81 MG EC tablet; Take 1 tablet by mouth Daily.  Dispense: 90 tablet; Refill: 1    6. Hypertension, unspecified type  -     ECG 12 Lead  -     metoprolol succinate XL (TOPROL-XL) 100 MG 24 hr tablet; Take 1 tablet by mouth Daily.  Dispense: 90 tablet; Refill: 3    7. Hyperlipidemia, unspecified hyperlipidemia type  -     ECG 12 Lead  -     rosuvastatin (CRESTOR) 40 MG tablet; Take 1 tablet by mouth Daily.  Dispense: 90 tablet; Refill: 3    Other orders  -     lisinopril-hydrochlorothiazide (PRINZIDE,ZESTORETIC) 20-25 MG per tablet; Take 1 tablet by mouth Daily.  Dispense: 90 tablet; Refill: 3        Ryland Trujillo  reports that he quit smoking about 20 years ago. His smoking use included cigarettes. He started smoking about 50 years ago. He has a 30 pack-year smoking history. He has been exposed to tobacco smoke. He has never used smokeless tobacco. I have educated him on the risk of diseases from using tobacco products. Patient does not smoke.       BMI is within normal parameters. No other follow-up for BMI required.    Advance Care Planning   ACP discussion was held with the patient during this visit. Patient does not have an advance directive, declines further assistance.             MEDS ORDERED DURING VISIT:  New Medications Ordered This Visit   Medications    amLODIPine (NORVASC) 10 MG tablet     Sig: Take 1 tablet by mouth Daily.     Dispense:  90 tablet     Refill:  3    aspirin 81 MG EC tablet     Sig: Take 1 tablet by  mouth Daily.     Dispense:  90 tablet     Refill:  1    ezetimibe (ZETIA) 10 MG tablet     Sig: Take 1 tablet by mouth Daily.     Dispense:  90 tablet     Refill:  3    lisinopril-hydrochlorothiazide (PRINZIDE,ZESTORETIC) 20-25 MG per tablet     Sig: Take 1 tablet by mouth Daily.     Dispense:  90 tablet     Refill:  3    metoprolol succinate XL (TOPROL-XL) 100 MG 24 hr tablet     Sig: Take 1 tablet by mouth Daily.     Dispense:  90 tablet     Refill:  3    rosuvastatin (CRESTOR) 40 MG tablet     Sig: Take 1 tablet by mouth Daily.     Dispense:  90 tablet     Refill:  3           This document has been electronically signed by Gio Merino Jr., APRN  February 18, 2025 08:53 EST

## 2025-02-20 ENCOUNTER — TELEPHONE (OUTPATIENT)
Dept: CARDIOLOGY | Facility: CLINIC | Age: 75
End: 2025-02-20
Payer: MEDICARE

## 2025-02-20 NOTE — TELEPHONE ENCOUNTER
Relay    I called and left a message on voicemail with lab results as follows:    CMP was relatively normal with a mildly decreased sodium of 135.    Normal CBC, TSH, magnesium.    Lipid panel showed triglycerides 87, HDL 46, LDL 54.  All within goal.    Continue current medications without change.  Keep follow-up.

## 2025-05-01 DIAGNOSIS — I10 ESSENTIAL (PRIMARY) HYPERTENSION: ICD-10-CM

## 2025-05-01 DIAGNOSIS — I10 PRIMARY HYPERTENSION: ICD-10-CM

## 2025-05-01 DIAGNOSIS — I73.9 ASYMPTOMATIC PVD (PERIPHERAL VASCULAR DISEASE): ICD-10-CM

## 2025-05-01 DIAGNOSIS — I10 HYPERTENSION, UNSPECIFIED TYPE: ICD-10-CM

## 2025-05-01 DIAGNOSIS — E78.5 HYPERLIPIDEMIA, UNSPECIFIED HYPERLIPIDEMIA TYPE: ICD-10-CM

## 2025-05-01 DIAGNOSIS — E78.2 MIXED HYPERLIPIDEMIA: ICD-10-CM

## 2025-05-01 RX ORDER — METOPROLOL SUCCINATE 100 MG/1
100 TABLET, EXTENDED RELEASE ORAL DAILY
Qty: 90 TABLET | Refills: 3 | Status: SHIPPED | OUTPATIENT
Start: 2025-05-01

## 2025-05-01 RX ORDER — AMLODIPINE BESYLATE 10 MG/1
10 TABLET ORAL DAILY
Qty: 90 TABLET | Refills: 3 | Status: SHIPPED | OUTPATIENT
Start: 2025-05-01

## 2025-05-01 RX ORDER — ROSUVASTATIN CALCIUM 40 MG/1
40 TABLET, COATED ORAL DAILY
Qty: 90 TABLET | Refills: 3 | Status: SHIPPED | OUTPATIENT
Start: 2025-05-01

## 2025-05-01 RX ORDER — CILOSTAZOL 100 MG/1
100 TABLET ORAL 2 TIMES DAILY
Qty: 180 TABLET | Refills: 3 | Status: SHIPPED | OUTPATIENT
Start: 2025-05-01

## 2025-05-01 RX ORDER — EZETIMIBE 10 MG/1
10 TABLET ORAL DAILY
Qty: 90 TABLET | Refills: 3 | Status: SHIPPED | OUTPATIENT
Start: 2025-05-01

## 2025-05-01 RX ORDER — LISINOPRIL AND HYDROCHLOROTHIAZIDE 20; 25 MG/1; MG/1
1 TABLET ORAL DAILY
Qty: 90 TABLET | Refills: 3 | Status: SHIPPED | OUTPATIENT
Start: 2025-05-01

## 2025-08-10 DIAGNOSIS — I73.9 ASYMPTOMATIC PVD (PERIPHERAL VASCULAR DISEASE): ICD-10-CM

## 2025-08-11 RX ORDER — CILOSTAZOL 100 MG/1
100 TABLET ORAL 2 TIMES DAILY
Qty: 180 TABLET | Refills: 3 | Status: SHIPPED | OUTPATIENT
Start: 2025-08-11

## 2025-08-18 ENCOUNTER — OFFICE VISIT (OUTPATIENT)
Dept: CARDIOLOGY | Facility: CLINIC | Age: 75
End: 2025-08-18
Payer: MEDICARE

## 2025-08-18 VITALS
OXYGEN SATURATION: 98 % | BODY MASS INDEX: 22.19 KG/M2 | HEIGHT: 69 IN | DIASTOLIC BLOOD PRESSURE: 58 MMHG | HEART RATE: 68 BPM | WEIGHT: 149.8 LBS | SYSTOLIC BLOOD PRESSURE: 88 MMHG

## 2025-08-18 DIAGNOSIS — Z95.1 S/P CABG (CORONARY ARTERY BYPASS GRAFT): Primary | ICD-10-CM

## 2025-08-18 DIAGNOSIS — R07.2 PRECORDIAL PAIN: ICD-10-CM

## 2025-08-18 DIAGNOSIS — I10 HYPERTENSION, UNSPECIFIED TYPE: ICD-10-CM

## 2025-08-18 DIAGNOSIS — R00.2 PALPITATIONS: ICD-10-CM

## 2025-08-18 RX ORDER — FAMOTIDINE 40 MG/1
40 TABLET, FILM COATED ORAL DAILY
COMMUNITY

## 2025-08-28 ENCOUNTER — HOSPITAL ENCOUNTER (OUTPATIENT)
Dept: CARDIOLOGY | Facility: HOSPITAL | Age: 75
Discharge: HOME | End: 2025-08-28
Payer: MEDICARE

## 2025-08-28 DIAGNOSIS — R07.2 PRECORDIAL PAIN: ICD-10-CM

## 2025-08-28 DIAGNOSIS — Z95.1 S/P CABG (CORONARY ARTERY BYPASS GRAFT): ICD-10-CM

## 2025-08-28 DIAGNOSIS — R00.2 PALPITATIONS: ICD-10-CM

## 2025-08-28 DIAGNOSIS — I10 HYPERTENSION, UNSPECIFIED TYPE: ICD-10-CM

## 2025-08-28 PROCEDURE — 78452 HT MUSCLE IMAGE SPECT MULT: CPT

## 2025-08-28 PROCEDURE — 93017 CV STRESS TEST TRACING ONLY: CPT

## 2025-08-28 PROCEDURE — 25010000002 REGADENOSON 0.4 MG/5ML SOLUTION: Performed by: INTERNAL MEDICINE

## 2025-08-28 PROCEDURE — A9500 TC99M SESTAMIBI: HCPCS | Performed by: INTERNAL MEDICINE

## 2025-08-28 PROCEDURE — 34310000005 TECHNETIUM SESTAMIBI: Performed by: INTERNAL MEDICINE

## 2025-08-28 RX ORDER — REGADENOSON 0.08 MG/ML
0.4 INJECTION, SOLUTION INTRAVENOUS
Status: COMPLETED | OUTPATIENT
Start: 2025-08-28 | End: 2025-08-28

## 2025-08-28 RX ADMIN — TECHNETIUM TC 99M SESTAMIBI 1 DOSE: 1 INJECTION INTRAVENOUS at 11:19

## 2025-08-28 RX ADMIN — REGADENOSON 0.4 MG: 0.08 INJECTION, SOLUTION INTRAVENOUS at 11:19

## 2025-08-30 LAB
BH CV REST NUCLEAR ISOTOPE DOSE: 10 MCI
BH CV STRESS COMMENTS STAGE 1: NORMAL
BH CV STRESS DOSE REGADENOSON STAGE 1: 0.4
BH CV STRESS DURATION MIN STAGE 1: 0
BH CV STRESS DURATION SEC STAGE 1: 10
BH CV STRESS NUCLEAR ISOTOPE DOSE: 30 MCI
BH CV STRESS PROTOCOL 1: NORMAL
BH CV STRESS RECOVERY BP: NORMAL MMHG
BH CV STRESS RECOVERY HR: 90 BPM
BH CV STRESS STAGE 1: 1
MAXIMAL PREDICTED HEART RATE: 147 BPM
PERCENT MAX PREDICTED HR: 68.71 %
STRESS BASELINE BP: NORMAL MMHG
STRESS BASELINE HR: 55 BPM
STRESS PERCENT HR: 81 %
STRESS POST ESTIMATED WORKLOAD: 1 METS
STRESS POST EXERCISE DUR MIN: 2 MIN
STRESS POST PEAK BP: NORMAL MMHG
STRESS POST PEAK HR: 101 BPM
STRESS TARGET HR: 125 BPM